# Patient Record
Sex: FEMALE | Race: BLACK OR AFRICAN AMERICAN | Employment: OTHER | URBAN - METROPOLITAN AREA
[De-identification: names, ages, dates, MRNs, and addresses within clinical notes are randomized per-mention and may not be internally consistent; named-entity substitution may affect disease eponyms.]

---

## 2019-01-14 ENCOUNTER — HOSPITAL ENCOUNTER (INPATIENT)
Age: 79
LOS: 3 days | Discharge: HOME | DRG: 378 | End: 2019-01-17
Attending: EMERGENCY MEDICINE | Admitting: INTERNAL MEDICINE
Payer: MEDICARE

## 2019-01-14 ENCOUNTER — APPOINTMENT (OUTPATIENT)
Dept: CT IMAGING | Age: 79
DRG: 378 | End: 2019-01-14
Payer: MEDICARE

## 2019-01-14 ENCOUNTER — APPOINTMENT (OUTPATIENT)
Dept: GENERAL RADIOLOGY | Age: 79
DRG: 378 | End: 2019-01-14
Payer: MEDICARE

## 2019-01-14 DIAGNOSIS — K92.1 HEMATOCHEZIA: Primary | ICD-10-CM

## 2019-01-14 PROBLEM — J45.909 ASTHMA: Status: ACTIVE | Noted: 2019-01-14

## 2019-01-14 PROBLEM — I10 HTN (HYPERTENSION): Status: ACTIVE | Noted: 2019-01-14

## 2019-01-14 PROBLEM — Z86.018: Status: ACTIVE | Noted: 2019-01-14

## 2019-01-14 PROBLEM — K92.2 GI BLEED: Status: ACTIVE | Noted: 2019-01-14

## 2019-01-14 LAB
ABO/RH: NORMAL
ABSOLUTE EOS #: 0.1 K/UL (ref 0–0.44)
ABSOLUTE IMMATURE GRANULOCYTE: <0.03 K/UL (ref 0–0.3)
ABSOLUTE LYMPH #: 1.11 K/UL (ref 1.1–3.7)
ABSOLUTE MONO #: 0.3 K/UL (ref 0.1–1.2)
ALBUMIN SERPL-MCNC: 3.4 G/DL (ref 3.5–5.2)
ALBUMIN/GLOBULIN RATIO: 1.3 (ref 1–2.5)
ALP BLD-CCNC: 72 U/L (ref 35–104)
ALT SERPL-CCNC: 9 U/L (ref 5–33)
ANION GAP SERPL CALCULATED.3IONS-SCNC: 11 MMOL/L (ref 9–17)
ANTIBODY SCREEN: NEGATIVE
ARM BAND NUMBER: NORMAL
AST SERPL-CCNC: 12 U/L
BASOPHILS # BLD: 0 % (ref 0–2)
BASOPHILS ABSOLUTE: <0.03 K/UL (ref 0–0.2)
BILIRUB SERPL-MCNC: 0.38 MG/DL (ref 0.3–1.2)
BUN BLDV-MCNC: 19 MG/DL (ref 8–23)
BUN/CREAT BLD: ABNORMAL (ref 9–20)
CALCIUM SERPL-MCNC: 9.5 MG/DL (ref 8.6–10.4)
CHLORIDE BLD-SCNC: 111 MMOL/L (ref 98–107)
CO2: 22 MMOL/L (ref 20–31)
CREAT SERPL-MCNC: 0.85 MG/DL (ref 0.5–0.9)
DIFFERENTIAL TYPE: ABNORMAL
EKG ATRIAL RATE: 92 BPM
EKG P AXIS: 23 DEGREES
EKG P-R INTERVAL: 180 MS
EKG Q-T INTERVAL: 368 MS
EKG QRS DURATION: 78 MS
EKG QTC CALCULATION (BAZETT): 455 MS
EKG R AXIS: -39 DEGREES
EKG T AXIS: 49 DEGREES
EKG VENTRICULAR RATE: 92 BPM
EOSINOPHILS RELATIVE PERCENT: 2 % (ref 1–4)
EXPIRATION DATE: NORMAL
FERRITIN: 64 UG/L (ref 13–150)
GFR AFRICAN AMERICAN: >60 ML/MIN
GFR NON-AFRICAN AMERICAN: >60 ML/MIN
GFR SERPL CREATININE-BSD FRML MDRD: ABNORMAL ML/MIN/{1.73_M2}
GFR SERPL CREATININE-BSD FRML MDRD: ABNORMAL ML/MIN/{1.73_M2}
GLUCOSE BLD-MCNC: 109 MG/DL (ref 70–99)
HCT VFR BLD CALC: 28.3 % (ref 36.3–47.1)
HCT VFR BLD CALC: 31.7 % (ref 36.3–47.1)
HEMOGLOBIN: 10.1 G/DL (ref 11.9–15.1)
HEMOGLOBIN: 8.7 G/DL (ref 11.9–15.1)
IMMATURE GRANULOCYTES: 0 %
INR BLD: 1.1
IRON SATURATION: 16 % (ref 20–55)
IRON: 39 UG/DL (ref 37–145)
LIPASE: 20 U/L (ref 13–60)
LYMPHOCYTES # BLD: 23 % (ref 24–43)
MCH RBC QN AUTO: 30.4 PG (ref 25.2–33.5)
MCHC RBC AUTO-ENTMCNC: 31.9 G/DL (ref 28.4–34.8)
MCV RBC AUTO: 95.5 FL (ref 82.6–102.9)
MONOCYTES # BLD: 6 % (ref 3–12)
NRBC AUTOMATED: 0 PER 100 WBC
PDW BLD-RTO: 12.6 % (ref 11.8–14.4)
PLATELET # BLD: 91 K/UL (ref 138–453)
PLATELET ESTIMATE: ABNORMAL
PMV BLD AUTO: 12.7 FL (ref 8.1–13.5)
POTASSIUM SERPL-SCNC: 3.9 MMOL/L (ref 3.7–5.3)
PROTHROMBIN TIME: 11.2 SEC (ref 9–12)
RBC # BLD: 3.32 M/UL (ref 3.95–5.11)
RBC # BLD: ABNORMAL 10*6/UL
SEG NEUTROPHILS: 69 % (ref 36–65)
SEGMENTED NEUTROPHILS ABSOLUTE COUNT: 3.28 K/UL (ref 1.5–8.1)
SODIUM BLD-SCNC: 144 MMOL/L (ref 135–144)
TOTAL IRON BINDING CAPACITY: 237 UG/DL (ref 250–450)
TOTAL PROTEIN: 6 G/DL (ref 6.4–8.3)
TROPONIN INTERP: ABNORMAL
TROPONIN INTERP: ABNORMAL
TROPONIN T: ABNORMAL NG/ML
TROPONIN T: ABNORMAL NG/ML
TROPONIN, HIGH SENSITIVITY: 32 NG/L (ref 0–14)
TROPONIN, HIGH SENSITIVITY: 35 NG/L (ref 0–14)
UNSATURATED IRON BINDING CAPACITY: 198 UG/DL (ref 112–347)
WBC # BLD: 4.8 K/UL (ref 3.5–11.3)
WBC # BLD: ABNORMAL 10*3/UL

## 2019-01-14 PROCEDURE — C9113 INJ PANTOPRAZOLE SODIUM, VIA: HCPCS | Performed by: STUDENT IN AN ORGANIZED HEALTH CARE EDUCATION/TRAINING PROGRAM

## 2019-01-14 PROCEDURE — 97530 THERAPEUTIC ACTIVITIES: CPT

## 2019-01-14 PROCEDURE — 83550 IRON BINDING TEST: CPT

## 2019-01-14 PROCEDURE — 6360000002 HC RX W HCPCS: Performed by: STUDENT IN AN ORGANIZED HEALTH CARE EDUCATION/TRAINING PROGRAM

## 2019-01-14 PROCEDURE — 83036 HEMOGLOBIN GLYCOSYLATED A1C: CPT

## 2019-01-14 PROCEDURE — 85610 PROTHROMBIN TIME: CPT

## 2019-01-14 PROCEDURE — APPNB30 APP NON BILLABLE TIME 0-30 MINS: Performed by: INTERNAL MEDICINE

## 2019-01-14 PROCEDURE — 71045 X-RAY EXAM CHEST 1 VIEW: CPT

## 2019-01-14 PROCEDURE — 2580000003 HC RX 258: Performed by: STUDENT IN AN ORGANIZED HEALTH CARE EDUCATION/TRAINING PROGRAM

## 2019-01-14 PROCEDURE — 86900 BLOOD TYPING SEROLOGIC ABO: CPT

## 2019-01-14 PROCEDURE — 74174 CTA ABD&PLVS W/CONTRAST: CPT

## 2019-01-14 PROCEDURE — 85014 HEMATOCRIT: CPT

## 2019-01-14 PROCEDURE — 36415 COLL VENOUS BLD VENIPUNCTURE: CPT

## 2019-01-14 PROCEDURE — 82728 ASSAY OF FERRITIN: CPT

## 2019-01-14 PROCEDURE — 83690 ASSAY OF LIPASE: CPT

## 2019-01-14 PROCEDURE — 6370000000 HC RX 637 (ALT 250 FOR IP): Performed by: INTERNAL MEDICINE

## 2019-01-14 PROCEDURE — 86850 RBC ANTIBODY SCREEN: CPT

## 2019-01-14 PROCEDURE — 93005 ELECTROCARDIOGRAM TRACING: CPT

## 2019-01-14 PROCEDURE — 6370000000 HC RX 637 (ALT 250 FOR IP): Performed by: STUDENT IN AN ORGANIZED HEALTH CARE EDUCATION/TRAINING PROGRAM

## 2019-01-14 PROCEDURE — 84484 ASSAY OF TROPONIN QUANT: CPT

## 2019-01-14 PROCEDURE — 85025 COMPLETE CBC W/AUTO DIFF WBC: CPT

## 2019-01-14 PROCEDURE — 1200000000 HC SEMI PRIVATE

## 2019-01-14 PROCEDURE — 85018 HEMOGLOBIN: CPT

## 2019-01-14 PROCEDURE — 86901 BLOOD TYPING SEROLOGIC RH(D): CPT

## 2019-01-14 PROCEDURE — 83540 ASSAY OF IRON: CPT

## 2019-01-14 PROCEDURE — 99285 EMERGENCY DEPT VISIT HI MDM: CPT

## 2019-01-14 PROCEDURE — 97162 PT EVAL MOD COMPLEX 30 MIN: CPT

## 2019-01-14 PROCEDURE — 80053 COMPREHEN METABOLIC PANEL: CPT

## 2019-01-14 PROCEDURE — 6360000004 HC RX CONTRAST MEDICATION: Performed by: EMERGENCY MEDICINE

## 2019-01-14 RX ORDER — SODIUM CHLORIDE 0.9 % (FLUSH) 0.9 %
10 SYRINGE (ML) INJECTION PRN
Status: DISCONTINUED | OUTPATIENT
Start: 2019-01-14 | End: 2019-01-17 | Stop reason: HOSPADM

## 2019-01-14 RX ORDER — ALBUTEROL SULFATE 2.5 MG/3ML
2.5 SOLUTION RESPIRATORY (INHALATION) EVERY 6 HOURS PRN
Status: ON HOLD | COMMUNITY
End: 2019-01-14 | Stop reason: CLARIF

## 2019-01-14 RX ORDER — ASPIRIN 81 MG/1
81 TABLET ORAL DAILY
COMMUNITY

## 2019-01-14 RX ORDER — 0.9 % SODIUM CHLORIDE 0.9 %
500 INTRAVENOUS SOLUTION INTRAVENOUS ONCE
Status: COMPLETED | OUTPATIENT
Start: 2019-01-14 | End: 2019-01-14

## 2019-01-14 RX ORDER — METOPROLOL SUCCINATE 25 MG/1
25 TABLET, EXTENDED RELEASE ORAL 2 TIMES DAILY
Status: ON HOLD | COMMUNITY
End: 2021-01-01 | Stop reason: SDUPTHER

## 2019-01-14 RX ORDER — HYDRALAZINE HYDROCHLORIDE 25 MG/1
25 TABLET, FILM COATED ORAL 2 TIMES DAILY
Status: DISCONTINUED | OUTPATIENT
Start: 2019-01-14 | End: 2019-01-15

## 2019-01-14 RX ORDER — 0.9 % SODIUM CHLORIDE 0.9 %
10 VIAL (ML) INJECTION EVERY 12 HOURS
Status: DISCONTINUED | OUTPATIENT
Start: 2019-01-14 | End: 2019-01-17 | Stop reason: HOSPADM

## 2019-01-14 RX ORDER — ALBUTEROL SULFATE 90 UG/1
2 AEROSOL, METERED RESPIRATORY (INHALATION) EVERY 4 HOURS PRN
Status: DISCONTINUED | OUTPATIENT
Start: 2019-01-14 | End: 2019-01-17 | Stop reason: HOSPADM

## 2019-01-14 RX ORDER — POTASSIUM CHLORIDE 750 MG/1
20 CAPSULE, EXTENDED RELEASE ORAL 2 TIMES DAILY
COMMUNITY

## 2019-01-14 RX ORDER — MONTELUKAST SODIUM 10 MG/1
5 TABLET ORAL NIGHTLY
COMMUNITY

## 2019-01-14 RX ORDER — METOPROLOL SUCCINATE 25 MG/1
25 TABLET, EXTENDED RELEASE ORAL 2 TIMES DAILY
Status: DISCONTINUED | OUTPATIENT
Start: 2019-01-14 | End: 2019-01-17 | Stop reason: HOSPADM

## 2019-01-14 RX ORDER — ACETAMINOPHEN 325 MG/1
650 TABLET ORAL EVERY 4 HOURS PRN
Status: DISCONTINUED | OUTPATIENT
Start: 2019-01-14 | End: 2019-01-17 | Stop reason: HOSPADM

## 2019-01-14 RX ORDER — PANTOPRAZOLE SODIUM 40 MG/10ML
40 INJECTION, POWDER, LYOPHILIZED, FOR SOLUTION INTRAVENOUS EVERY 12 HOURS
Status: DISCONTINUED | OUTPATIENT
Start: 2019-01-14 | End: 2019-01-15

## 2019-01-14 RX ORDER — HYDROCHLOROTHIAZIDE 25 MG/1
25 TABLET ORAL 2 TIMES DAILY
Status: ON HOLD | COMMUNITY
End: 2019-01-14 | Stop reason: CLARIF

## 2019-01-14 RX ORDER — SODIUM CHLORIDE 9 MG/ML
INJECTION, SOLUTION INTRAVENOUS CONTINUOUS
Status: DISCONTINUED | OUTPATIENT
Start: 2019-01-14 | End: 2019-01-17

## 2019-01-14 RX ORDER — SODIUM CHLORIDE 0.9 % (FLUSH) 0.9 %
10 SYRINGE (ML) INJECTION EVERY 12 HOURS SCHEDULED
Status: DISCONTINUED | OUTPATIENT
Start: 2019-01-14 | End: 2019-01-14

## 2019-01-14 RX ORDER — MONTELUKAST SODIUM 10 MG/1
5 TABLET ORAL NIGHTLY
Status: DISCONTINUED | OUTPATIENT
Start: 2019-01-14 | End: 2019-01-17 | Stop reason: HOSPADM

## 2019-01-14 RX ORDER — SODIUM CHLORIDE 0.9 % (FLUSH) 0.9 %
10 SYRINGE (ML) INJECTION PRN
Status: DISCONTINUED | OUTPATIENT
Start: 2019-01-14 | End: 2019-01-14

## 2019-01-14 RX ORDER — HYDRALAZINE HYDROCHLORIDE 25 MG/1
25 TABLET, FILM COATED ORAL 2 TIMES DAILY
Status: ON HOLD | COMMUNITY
End: 2019-01-17 | Stop reason: HOSPADM

## 2019-01-14 RX ORDER — SODIUM CHLORIDE 0.9 % (FLUSH) 0.9 %
10 SYRINGE (ML) INJECTION EVERY 12 HOURS SCHEDULED
Status: DISCONTINUED | OUTPATIENT
Start: 2019-01-14 | End: 2019-01-17 | Stop reason: HOSPADM

## 2019-01-14 RX ORDER — ONDANSETRON 2 MG/ML
4 INJECTION INTRAMUSCULAR; INTRAVENOUS EVERY 6 HOURS PRN
Status: DISCONTINUED | OUTPATIENT
Start: 2019-01-14 | End: 2019-01-17 | Stop reason: HOSPADM

## 2019-01-14 RX ADMIN — SODIUM CHLORIDE: 9 INJECTION, SOLUTION INTRAVENOUS at 11:51

## 2019-01-14 RX ADMIN — POLYETHYLENE GLYCOL 3350, SODIUM SULFATE ANHYDROUS, SODIUM BICARBONATE, SODIUM CHLORIDE, POTASSIUM CHLORIDE 2000 ML: 236; 22.74; 6.74; 5.86; 2.97 POWDER, FOR SOLUTION ORAL at 17:35

## 2019-01-14 RX ADMIN — SODIUM CHLORIDE 80 MG: 9 INJECTION, SOLUTION INTRAVENOUS at 10:06

## 2019-01-14 RX ADMIN — IOPAMIDOL 75 ML: 755 INJECTION, SOLUTION INTRAVENOUS at 06:53

## 2019-01-14 RX ADMIN — SODIUM CHLORIDE 500 ML: 0.9 INJECTION, SOLUTION INTRAVENOUS at 06:00

## 2019-01-14 RX ADMIN — HYDRALAZINE HYDROCHLORIDE 25 MG: 25 TABLET, FILM COATED ORAL at 22:09

## 2019-01-14 RX ADMIN — Medication 10 ML: at 22:09

## 2019-01-14 RX ADMIN — METOPROLOL SUCCINATE 25 MG: 25 TABLET, FILM COATED, EXTENDED RELEASE ORAL at 22:09

## 2019-01-14 RX ADMIN — POLYETHYLENE GLYCOL 3350, SODIUM SULFATE ANHYDROUS, SODIUM BICARBONATE, SODIUM CHLORIDE, POTASSIUM CHLORIDE 2000 ML: 236; 22.74; 6.74; 5.86; 2.97 POWDER, FOR SOLUTION ORAL at 22:12

## 2019-01-14 RX ADMIN — MONTELUKAST SODIUM 5 MG: 10 TABLET, FILM COATED ORAL at 22:09

## 2019-01-14 ASSESSMENT — PAIN SCALES - GENERAL
PAINLEVEL_OUTOF10: 0
PAINLEVEL_OUTOF10: 5

## 2019-01-14 ASSESSMENT — PAIN DESCRIPTION - LOCATION: LOCATION: ABDOMEN

## 2019-01-14 ASSESSMENT — ENCOUNTER SYMPTOMS
NAUSEA: 0
BLOOD IN STOOL: 1
ABDOMINAL PAIN: 1
VOMITING: 0
SHORTNESS OF BREATH: 0
DIARRHEA: 0

## 2019-01-15 ENCOUNTER — ANESTHESIA (OUTPATIENT)
Dept: ENDOSCOPY | Age: 79
DRG: 378 | End: 2019-01-15
Payer: MEDICARE

## 2019-01-15 ENCOUNTER — ANESTHESIA EVENT (OUTPATIENT)
Dept: ENDOSCOPY | Age: 79
DRG: 378 | End: 2019-01-15
Payer: MEDICARE

## 2019-01-15 VITALS
DIASTOLIC BLOOD PRESSURE: 115 MMHG | OXYGEN SATURATION: 98 % | RESPIRATION RATE: 31 BRPM | SYSTOLIC BLOOD PRESSURE: 150 MMHG

## 2019-01-15 LAB
ABSOLUTE EOS #: 0.14 K/UL (ref 0–0.44)
ABSOLUTE IMMATURE GRANULOCYTE: <0.03 K/UL (ref 0–0.3)
ABSOLUTE LYMPH #: 1.43 K/UL (ref 1.1–3.7)
ABSOLUTE MONO #: 0.27 K/UL (ref 0.1–1.2)
ANION GAP SERPL CALCULATED.3IONS-SCNC: 12 MMOL/L (ref 9–17)
BASOPHILS # BLD: 0 % (ref 0–2)
BASOPHILS ABSOLUTE: <0.03 K/UL (ref 0–0.2)
BUN BLDV-MCNC: 8 MG/DL (ref 8–23)
BUN/CREAT BLD: ABNORMAL (ref 9–20)
CALCIUM SERPL-MCNC: 9.4 MG/DL (ref 8.6–10.4)
CHLORIDE BLD-SCNC: 110 MMOL/L (ref 98–107)
CO2: 22 MMOL/L (ref 20–31)
CREAT SERPL-MCNC: 0.56 MG/DL (ref 0.5–0.9)
DIFFERENTIAL TYPE: ABNORMAL
EOSINOPHILS RELATIVE PERCENT: 3 % (ref 1–4)
ESTIMATED AVERAGE GLUCOSE: 103 MG/DL
GFR AFRICAN AMERICAN: >60 ML/MIN
GFR NON-AFRICAN AMERICAN: >60 ML/MIN
GFR SERPL CREATININE-BSD FRML MDRD: ABNORMAL ML/MIN/{1.73_M2}
GFR SERPL CREATININE-BSD FRML MDRD: ABNORMAL ML/MIN/{1.73_M2}
GLUCOSE BLD-MCNC: 84 MG/DL (ref 65–105)
GLUCOSE BLD-MCNC: 88 MG/DL (ref 70–99)
GLUCOSE BLD-MCNC: 97 MG/DL (ref 65–105)
HBA1C MFR BLD: 5.2 % (ref 4–6)
HCT VFR BLD CALC: 26.4 % (ref 36.3–47.1)
HCT VFR BLD CALC: 29.2 % (ref 36.3–47.1)
HEMOGLOBIN: 8.4 G/DL (ref 11.9–15.1)
HEMOGLOBIN: 9.2 G/DL (ref 11.9–15.1)
IMMATURE GRANULOCYTES: 0 %
LYMPHOCYTES # BLD: 35 % (ref 24–43)
MAGNESIUM: 1.9 MG/DL (ref 1.6–2.6)
MCH RBC QN AUTO: 31.3 PG (ref 25.2–33.5)
MCHC RBC AUTO-ENTMCNC: 31.8 G/DL (ref 28.4–34.8)
MCV RBC AUTO: 98.5 FL (ref 82.6–102.9)
MONOCYTES # BLD: 7 % (ref 3–12)
NRBC AUTOMATED: 0 PER 100 WBC
PDW BLD-RTO: 12.9 % (ref 11.8–14.4)
PLATELET # BLD: ABNORMAL K/UL (ref 138–453)
PLATELET ESTIMATE: ABNORMAL
PLATELET, FLUORESCENCE: 135 K/UL (ref 138–453)
PLATELET, IMMATURE FRACTION: 3.6 % (ref 1.1–10.3)
PMV BLD AUTO: ABNORMAL FL (ref 8.1–13.5)
POTASSIUM SERPL-SCNC: 3.4 MMOL/L (ref 3.7–5.3)
RBC # BLD: 2.68 M/UL (ref 3.95–5.11)
RBC # BLD: ABNORMAL 10*6/UL
SEG NEUTROPHILS: 54 % (ref 36–65)
SEGMENTED NEUTROPHILS ABSOLUTE COUNT: 2.2 K/UL (ref 1.5–8.1)
SODIUM BLD-SCNC: 144 MMOL/L (ref 135–144)
WBC # BLD: 4.1 K/UL (ref 3.5–11.3)
WBC # BLD: ABNORMAL 10*3/UL

## 2019-01-15 PROCEDURE — 85055 RETICULATED PLATELET ASSAY: CPT

## 2019-01-15 PROCEDURE — 97166 OT EVAL MOD COMPLEX 45 MIN: CPT

## 2019-01-15 PROCEDURE — 0DB68ZX EXCISION OF STOMACH, VIA NATURAL OR ARTIFICIAL OPENING ENDOSCOPIC, DIAGNOSTIC: ICD-10-PCS | Performed by: INTERNAL MEDICINE

## 2019-01-15 PROCEDURE — 6370000000 HC RX 637 (ALT 250 FOR IP): Performed by: STUDENT IN AN ORGANIZED HEALTH CARE EDUCATION/TRAINING PROGRAM

## 2019-01-15 PROCEDURE — C9113 INJ PANTOPRAZOLE SODIUM, VIA: HCPCS | Performed by: INTERNAL MEDICINE

## 2019-01-15 PROCEDURE — 3609009500 HC COLONOSCOPY DIAGNOSTIC OR SCREENING: Performed by: INTERNAL MEDICINE

## 2019-01-15 PROCEDURE — 1200000000 HC SEMI PRIVATE

## 2019-01-15 PROCEDURE — 7100000011 HC PHASE II RECOVERY - ADDTL 15 MIN: Performed by: INTERNAL MEDICINE

## 2019-01-15 PROCEDURE — 3700000000 HC ANESTHESIA ATTENDED CARE: Performed by: INTERNAL MEDICINE

## 2019-01-15 PROCEDURE — 2580000003 HC RX 258: Performed by: STUDENT IN AN ORGANIZED HEALTH CARE EDUCATION/TRAINING PROGRAM

## 2019-01-15 PROCEDURE — C9113 INJ PANTOPRAZOLE SODIUM, VIA: HCPCS | Performed by: STUDENT IN AN ORGANIZED HEALTH CARE EDUCATION/TRAINING PROGRAM

## 2019-01-15 PROCEDURE — 6360000002 HC RX W HCPCS: Performed by: INTERNAL MEDICINE

## 2019-01-15 PROCEDURE — 43239 EGD BIOPSY SINGLE/MULTIPLE: CPT | Performed by: INTERNAL MEDICINE

## 2019-01-15 PROCEDURE — 2580000003 HC RX 258: Performed by: NURSE ANESTHETIST, CERTIFIED REGISTERED

## 2019-01-15 PROCEDURE — 3700000001 HC ADD 15 MINUTES (ANESTHESIA): Performed by: INTERNAL MEDICINE

## 2019-01-15 PROCEDURE — 88305 TISSUE EXAM BY PATHOLOGIST: CPT

## 2019-01-15 PROCEDURE — 97535 SELF CARE MNGMENT TRAINING: CPT

## 2019-01-15 PROCEDURE — 2580000003 HC RX 258: Performed by: INTERNAL MEDICINE

## 2019-01-15 PROCEDURE — 83735 ASSAY OF MAGNESIUM: CPT

## 2019-01-15 PROCEDURE — 36415 COLL VENOUS BLD VENIPUNCTURE: CPT

## 2019-01-15 PROCEDURE — 0DJD8ZZ INSPECTION OF LOWER INTESTINAL TRACT, VIA NATURAL OR ARTIFICIAL OPENING ENDOSCOPIC: ICD-10-PCS | Performed by: INTERNAL MEDICINE

## 2019-01-15 PROCEDURE — 82947 ASSAY GLUCOSE BLOOD QUANT: CPT

## 2019-01-15 PROCEDURE — 7100000010 HC PHASE II RECOVERY - FIRST 15 MIN: Performed by: INTERNAL MEDICINE

## 2019-01-15 PROCEDURE — 80048 BASIC METABOLIC PNL TOTAL CA: CPT

## 2019-01-15 PROCEDURE — 3609012400 HC EGD TRANSORAL BIOPSY SINGLE/MULTIPLE: Performed by: INTERNAL MEDICINE

## 2019-01-15 PROCEDURE — 6360000002 HC RX W HCPCS: Performed by: NURSE ANESTHETIST, CERTIFIED REGISTERED

## 2019-01-15 PROCEDURE — 85018 HEMOGLOBIN: CPT

## 2019-01-15 PROCEDURE — 85014 HEMATOCRIT: CPT

## 2019-01-15 PROCEDURE — G0121 COLON CA SCRN NOT HI RSK IND: HCPCS | Performed by: INTERNAL MEDICINE

## 2019-01-15 PROCEDURE — 2709999900 HC NON-CHARGEABLE SUPPLY: Performed by: INTERNAL MEDICINE

## 2019-01-15 PROCEDURE — 85025 COMPLETE CBC W/AUTO DIFF WBC: CPT

## 2019-01-15 PROCEDURE — 2500000003 HC RX 250 WO HCPCS: Performed by: NURSE ANESTHETIST, CERTIFIED REGISTERED

## 2019-01-15 PROCEDURE — 99223 1ST HOSP IP/OBS HIGH 75: CPT | Performed by: INTERNAL MEDICINE

## 2019-01-15 PROCEDURE — 6370000000 HC RX 637 (ALT 250 FOR IP): Performed by: INTERNAL MEDICINE

## 2019-01-15 PROCEDURE — 6360000002 HC RX W HCPCS: Performed by: STUDENT IN AN ORGANIZED HEALTH CARE EDUCATION/TRAINING PROGRAM

## 2019-01-15 RX ORDER — PROPOFOL 10 MG/ML
INJECTION, EMULSION INTRAVENOUS CONTINUOUS PRN
Status: DISCONTINUED | OUTPATIENT
Start: 2019-01-15 | End: 2019-01-15 | Stop reason: SDUPTHER

## 2019-01-15 RX ORDER — PANTOPRAZOLE SODIUM 40 MG/10ML
40 INJECTION, POWDER, LYOPHILIZED, FOR SOLUTION INTRAVENOUS ONCE
Status: COMPLETED | OUTPATIENT
Start: 2019-01-15 | End: 2019-01-15

## 2019-01-15 RX ORDER — PROPOFOL 10 MG/ML
INJECTION, EMULSION INTRAVENOUS PRN
Status: DISCONTINUED | OUTPATIENT
Start: 2019-01-15 | End: 2019-01-15 | Stop reason: SDUPTHER

## 2019-01-15 RX ORDER — HYDRALAZINE HYDROCHLORIDE 50 MG/1
50 TABLET, FILM COATED ORAL 2 TIMES DAILY
Status: DISCONTINUED | OUTPATIENT
Start: 2019-01-15 | End: 2019-01-17 | Stop reason: HOSPADM

## 2019-01-15 RX ORDER — SODIUM CHLORIDE 9 MG/ML
INJECTION, SOLUTION INTRAVENOUS CONTINUOUS PRN
Status: DISCONTINUED | OUTPATIENT
Start: 2019-01-15 | End: 2019-01-15 | Stop reason: SDUPTHER

## 2019-01-15 RX ORDER — GLYCOPYRROLATE 1 MG/5 ML
SYRINGE (ML) INTRAVENOUS PRN
Status: DISCONTINUED | OUTPATIENT
Start: 2019-01-15 | End: 2019-01-15 | Stop reason: SDUPTHER

## 2019-01-15 RX ORDER — POTASSIUM CHLORIDE 20 MEQ/1
40 TABLET, EXTENDED RELEASE ORAL 2 TIMES DAILY WITH MEALS
Status: COMPLETED | OUTPATIENT
Start: 2019-01-15 | End: 2019-01-15

## 2019-01-15 RX ORDER — 0.9 % SODIUM CHLORIDE 0.9 %
10 VIAL (ML) INJECTION ONCE
Status: COMPLETED | OUTPATIENT
Start: 2019-01-15 | End: 2019-01-15

## 2019-01-15 RX ORDER — LANOLIN ALCOHOL/MO/W.PET/CERES
325 CREAM (GRAM) TOPICAL 2 TIMES DAILY WITH MEALS
Status: DISCONTINUED | OUTPATIENT
Start: 2019-01-15 | End: 2019-01-17 | Stop reason: HOSPADM

## 2019-01-15 RX ADMIN — POTASSIUM CHLORIDE 40 MEQ: 1500 TABLET, EXTENDED RELEASE ORAL at 16:00

## 2019-01-15 RX ADMIN — POTASSIUM CHLORIDE 40 MEQ: 1500 TABLET, EXTENDED RELEASE ORAL at 10:25

## 2019-01-15 RX ADMIN — SODIUM CHLORIDE: 9 INJECTION, SOLUTION INTRAVENOUS at 09:15

## 2019-01-15 RX ADMIN — PROPOFOL 20 MG: 10 INJECTION, EMULSION INTRAVENOUS at 09:07

## 2019-01-15 RX ADMIN — PROPOFOL 30 MG: 10 INJECTION, EMULSION INTRAVENOUS at 09:08

## 2019-01-15 RX ADMIN — METOPROLOL SUCCINATE 25 MG: 25 TABLET, FILM COATED, EXTENDED RELEASE ORAL at 10:24

## 2019-01-15 RX ADMIN — PANTOPRAZOLE SODIUM 40 MG: 40 INJECTION, POWDER, FOR SOLUTION INTRAVENOUS at 00:27

## 2019-01-15 RX ADMIN — MONTELUKAST SODIUM 5 MG: 10 TABLET, FILM COATED ORAL at 21:28

## 2019-01-15 RX ADMIN — POLYETHYLENE GLYCOL 3350, SODIUM SULFATE ANHYDROUS, SODIUM BICARBONATE, SODIUM CHLORIDE, POTASSIUM CHLORIDE 4000 ML: 236; 22.74; 6.74; 5.86; 2.97 POWDER, FOR SOLUTION ORAL at 15:58

## 2019-01-15 RX ADMIN — ACETAMINOPHEN 650 MG: 325 TABLET ORAL at 10:46

## 2019-01-15 RX ADMIN — FERROUS SULFATE TAB EC 325 MG (65 MG FE EQUIVALENT) 325 MG: 325 (65 FE) TABLET DELAYED RESPONSE at 16:00

## 2019-01-15 RX ADMIN — PROPOFOL 75 MCG/KG/MIN: 10 INJECTION, EMULSION INTRAVENOUS at 09:04

## 2019-01-15 RX ADMIN — SODIUM CHLORIDE: 9 INJECTION, SOLUTION INTRAVENOUS at 09:00

## 2019-01-15 RX ADMIN — Medication 0.2 MG: at 09:01

## 2019-01-15 RX ADMIN — HYDRALAZINE HYDROCHLORIDE 50 MG: 50 TABLET, FILM COATED ORAL at 21:28

## 2019-01-15 RX ADMIN — PANTOPRAZOLE SODIUM 40 MG: 40 INJECTION, POWDER, FOR SOLUTION INTRAVENOUS at 10:51

## 2019-01-15 RX ADMIN — SODIUM CHLORIDE 10 ML: 9 INJECTION INTRAMUSCULAR; INTRAVENOUS; SUBCUTANEOUS at 00:27

## 2019-01-15 RX ADMIN — PROPOFOL 50 MG: 10 INJECTION, EMULSION INTRAVENOUS at 09:04

## 2019-01-15 RX ADMIN — PROPOFOL 50 MG: 10 INJECTION, EMULSION INTRAVENOUS at 09:06

## 2019-01-15 RX ADMIN — HYDRALAZINE HYDROCHLORIDE 50 MG: 50 TABLET, FILM COATED ORAL at 10:24

## 2019-01-15 RX ADMIN — Medication 10 ML: at 10:51

## 2019-01-15 RX ADMIN — METOPROLOL SUCCINATE 25 MG: 25 TABLET, FILM COATED, EXTENDED RELEASE ORAL at 19:27

## 2019-01-15 ASSESSMENT — PAIN SCALES - GENERAL
PAINLEVEL_OUTOF10: 0
PAINLEVEL_OUTOF10: 3
PAINLEVEL_OUTOF10: 3
PAINLEVEL_OUTOF10: 0
PAINLEVEL_OUTOF10: 3
PAINLEVEL_OUTOF10: 8

## 2019-01-15 ASSESSMENT — PAIN DESCRIPTION - PAIN TYPE: TYPE: ACUTE PAIN

## 2019-01-15 ASSESSMENT — PAIN DESCRIPTION - ORIENTATION
ORIENTATION: RIGHT

## 2019-01-15 ASSESSMENT — PAIN DESCRIPTION - LOCATION
LOCATION: HIP

## 2019-01-15 ASSESSMENT — PAIN DESCRIPTION - FREQUENCY: FREQUENCY: INTERMITTENT

## 2019-01-15 ASSESSMENT — PAIN - FUNCTIONAL ASSESSMENT: PAIN_FUNCTIONAL_ASSESSMENT: 0-10

## 2019-01-16 ENCOUNTER — ANESTHESIA (OUTPATIENT)
Dept: ENDOSCOPY | Age: 79
DRG: 378 | End: 2019-01-16
Payer: MEDICARE

## 2019-01-16 ENCOUNTER — APPOINTMENT (OUTPATIENT)
Dept: GENERAL RADIOLOGY | Age: 79
DRG: 378 | End: 2019-01-16
Payer: MEDICARE

## 2019-01-16 ENCOUNTER — ANESTHESIA EVENT (OUTPATIENT)
Dept: ENDOSCOPY | Age: 79
DRG: 378 | End: 2019-01-16
Payer: MEDICARE

## 2019-01-16 VITALS
DIASTOLIC BLOOD PRESSURE: 69 MMHG | SYSTOLIC BLOOD PRESSURE: 121 MMHG | RESPIRATION RATE: 21 BRPM | OXYGEN SATURATION: 99 %

## 2019-01-16 PROBLEM — K92.2 GI BLEED: Status: RESOLVED | Noted: 2019-01-14 | Resolved: 2019-01-16

## 2019-01-16 PROBLEM — M25.551 RIGHT HIP PAIN: Status: ACTIVE | Noted: 2019-01-16

## 2019-01-16 LAB
HCT VFR BLD CALC: 28.5 % (ref 36.3–47.1)
HCT VFR BLD CALC: 29.2 % (ref 36.3–47.1)
HEMOGLOBIN: 8.7 G/DL (ref 11.9–15.1)
HEMOGLOBIN: 8.9 G/DL (ref 11.9–15.1)
LV EF: 68 %
LVEF MODALITY: NORMAL
POTASSIUM SERPL-SCNC: 3.5 MMOL/L (ref 3.7–5.3)
SURGICAL PATHOLOGY REPORT: NORMAL

## 2019-01-16 PROCEDURE — 6370000000 HC RX 637 (ALT 250 FOR IP): Performed by: STUDENT IN AN ORGANIZED HEALTH CARE EDUCATION/TRAINING PROGRAM

## 2019-01-16 PROCEDURE — 85014 HEMATOCRIT: CPT

## 2019-01-16 PROCEDURE — 73502 X-RAY EXAM HIP UNI 2-3 VIEWS: CPT

## 2019-01-16 PROCEDURE — 73560 X-RAY EXAM OF KNEE 1 OR 2: CPT

## 2019-01-16 PROCEDURE — 84132 ASSAY OF SERUM POTASSIUM: CPT

## 2019-01-16 PROCEDURE — 6360000002 HC RX W HCPCS: Performed by: STUDENT IN AN ORGANIZED HEALTH CARE EDUCATION/TRAINING PROGRAM

## 2019-01-16 PROCEDURE — 1200000000 HC SEMI PRIVATE

## 2019-01-16 PROCEDURE — 3609010600 HC COLONOSCOPY POLYPECTOMY SNARE/COLD BIOPSY: Performed by: INTERNAL MEDICINE

## 2019-01-16 PROCEDURE — 2580000003 HC RX 258: Performed by: STUDENT IN AN ORGANIZED HEALTH CARE EDUCATION/TRAINING PROGRAM

## 2019-01-16 PROCEDURE — 99232 SBSQ HOSP IP/OBS MODERATE 35: CPT | Performed by: INTERNAL MEDICINE

## 2019-01-16 PROCEDURE — 85018 HEMOGLOBIN: CPT

## 2019-01-16 PROCEDURE — 0DBL8ZZ EXCISION OF TRANSVERSE COLON, VIA NATURAL OR ARTIFICIAL OPENING ENDOSCOPIC: ICD-10-PCS | Performed by: INTERNAL MEDICINE

## 2019-01-16 PROCEDURE — 0DBM8ZZ EXCISION OF DESCENDING COLON, VIA NATURAL OR ARTIFICIAL OPENING ENDOSCOPIC: ICD-10-PCS | Performed by: INTERNAL MEDICINE

## 2019-01-16 PROCEDURE — 93306 TTE W/DOPPLER COMPLETE: CPT

## 2019-01-16 PROCEDURE — 6360000002 HC RX W HCPCS: Performed by: SPECIALIST

## 2019-01-16 PROCEDURE — 88305 TISSUE EXAM BY PATHOLOGIST: CPT

## 2019-01-16 PROCEDURE — 3700000001 HC ADD 15 MINUTES (ANESTHESIA): Performed by: INTERNAL MEDICINE

## 2019-01-16 PROCEDURE — 45385 COLONOSCOPY W/LESION REMOVAL: CPT | Performed by: INTERNAL MEDICINE

## 2019-01-16 PROCEDURE — 3700000000 HC ANESTHESIA ATTENDED CARE: Performed by: INTERNAL MEDICINE

## 2019-01-16 PROCEDURE — 36415 COLL VENOUS BLD VENIPUNCTURE: CPT

## 2019-01-16 PROCEDURE — 2500000003 HC RX 250 WO HCPCS: Performed by: SPECIALIST

## 2019-01-16 PROCEDURE — 7100000010 HC PHASE II RECOVERY - FIRST 15 MIN: Performed by: INTERNAL MEDICINE

## 2019-01-16 PROCEDURE — 7100000011 HC PHASE II RECOVERY - ADDTL 15 MIN: Performed by: INTERNAL MEDICINE

## 2019-01-16 PROCEDURE — C1773 RET DEV, INSERTABLE: HCPCS | Performed by: INTERNAL MEDICINE

## 2019-01-16 RX ORDER — LIDOCAINE HYDROCHLORIDE 10 MG/ML
INJECTION, SOLUTION EPIDURAL; INFILTRATION; INTRACAUDAL; PERINEURAL PRN
Status: DISCONTINUED | OUTPATIENT
Start: 2019-01-16 | End: 2019-01-16 | Stop reason: SDUPTHER

## 2019-01-16 RX ORDER — PROPOFOL 10 MG/ML
INJECTION, EMULSION INTRAVENOUS PRN
Status: DISCONTINUED | OUTPATIENT
Start: 2019-01-16 | End: 2019-01-16 | Stop reason: SDUPTHER

## 2019-01-16 RX ORDER — ASPIRIN 81 MG/1
81 TABLET ORAL DAILY
Status: DISCONTINUED | OUTPATIENT
Start: 2019-01-16 | End: 2019-01-17 | Stop reason: HOSPADM

## 2019-01-16 RX ADMIN — ASPIRIN 81 MG: 81 TABLET ORAL at 14:43

## 2019-01-16 RX ADMIN — PROPOFOL 50 MG: 10 INJECTION, EMULSION INTRAVENOUS at 12:15

## 2019-01-16 RX ADMIN — ACETAMINOPHEN 650 MG: 325 TABLET ORAL at 19:14

## 2019-01-16 RX ADMIN — LIDOCAINE HYDROCHLORIDE 30 MG: 10 INJECTION, SOLUTION EPIDURAL; INFILTRATION; INTRACAUDAL at 12:05

## 2019-01-16 RX ADMIN — Medication 10 ML: at 09:47

## 2019-01-16 RX ADMIN — HYDRALAZINE HYDROCHLORIDE 50 MG: 50 TABLET, FILM COATED ORAL at 09:47

## 2019-01-16 RX ADMIN — PROPOFOL 50 MG: 10 INJECTION, EMULSION INTRAVENOUS at 12:20

## 2019-01-16 RX ADMIN — METOPROLOL SUCCINATE 25 MG: 25 TABLET, FILM COATED, EXTENDED RELEASE ORAL at 19:16

## 2019-01-16 RX ADMIN — PROPOFOL 50 MG: 10 INJECTION, EMULSION INTRAVENOUS at 12:05

## 2019-01-16 RX ADMIN — ENOXAPARIN SODIUM 30 MG: 30 INJECTION SUBCUTANEOUS at 14:43

## 2019-01-16 RX ADMIN — METOPROLOL SUCCINATE 25 MG: 25 TABLET, FILM COATED, EXTENDED RELEASE ORAL at 09:47

## 2019-01-16 RX ADMIN — HYDRALAZINE HYDROCHLORIDE 50 MG: 50 TABLET, FILM COATED ORAL at 20:59

## 2019-01-16 RX ADMIN — PROPOFOL 50 MG: 10 INJECTION, EMULSION INTRAVENOUS at 12:10

## 2019-01-16 RX ADMIN — MONTELUKAST SODIUM 5 MG: 10 TABLET, FILM COATED ORAL at 20:59

## 2019-01-16 RX ADMIN — FERROUS SULFATE TAB EC 325 MG (65 MG FE EQUIVALENT) 325 MG: 325 (65 FE) TABLET DELAYED RESPONSE at 19:15

## 2019-01-16 RX ADMIN — FERROUS SULFATE TAB EC 325 MG (65 MG FE EQUIVALENT) 325 MG: 325 (65 FE) TABLET DELAYED RESPONSE at 09:47

## 2019-01-16 ASSESSMENT — ENCOUNTER SYMPTOMS: SHORTNESS OF BREATH: 0

## 2019-01-16 ASSESSMENT — PAIN SCALES - GENERAL
PAINLEVEL_OUTOF10: 8
PAINLEVEL_OUTOF10: 3
PAINLEVEL_OUTOF10: 3

## 2019-01-16 ASSESSMENT — LIFESTYLE VARIABLES: SMOKING_STATUS: 0

## 2019-01-16 ASSESSMENT — PAIN - FUNCTIONAL ASSESSMENT: PAIN_FUNCTIONAL_ASSESSMENT: 0-10

## 2019-01-17 VITALS
TEMPERATURE: 98.7 F | HEIGHT: 68 IN | RESPIRATION RATE: 16 BRPM | WEIGHT: 246 LBS | SYSTOLIC BLOOD PRESSURE: 108 MMHG | BODY MASS INDEX: 37.28 KG/M2 | OXYGEN SATURATION: 97 % | HEART RATE: 79 BPM | DIASTOLIC BLOOD PRESSURE: 66 MMHG

## 2019-01-17 PROBLEM — M87.051 AVASCULAR NECROSIS OF RIGHT FEMORAL HEAD (HCC): Status: ACTIVE | Noted: 2019-01-17

## 2019-01-17 LAB
HCT VFR BLD CALC: 27.1 % (ref 36.3–47.1)
HEMOGLOBIN: 8.4 G/DL (ref 11.9–15.1)
SURGICAL PATHOLOGY REPORT: NORMAL

## 2019-01-17 PROCEDURE — 6370000000 HC RX 637 (ALT 250 FOR IP): Performed by: STUDENT IN AN ORGANIZED HEALTH CARE EDUCATION/TRAINING PROGRAM

## 2019-01-17 PROCEDURE — 85014 HEMATOCRIT: CPT

## 2019-01-17 PROCEDURE — 2580000003 HC RX 258: Performed by: STUDENT IN AN ORGANIZED HEALTH CARE EDUCATION/TRAINING PROGRAM

## 2019-01-17 PROCEDURE — 99221 1ST HOSP IP/OBS SF/LOW 40: CPT | Performed by: ORTHOPAEDIC SURGERY

## 2019-01-17 PROCEDURE — 94640 AIRWAY INHALATION TREATMENT: CPT

## 2019-01-17 PROCEDURE — 97530 THERAPEUTIC ACTIVITIES: CPT

## 2019-01-17 PROCEDURE — 36415 COLL VENOUS BLD VENIPUNCTURE: CPT

## 2019-01-17 PROCEDURE — 85018 HEMOGLOBIN: CPT

## 2019-01-17 PROCEDURE — 99239 HOSP IP/OBS DSCHRG MGMT >30: CPT | Performed by: INTERNAL MEDICINE

## 2019-01-17 PROCEDURE — 97110 THERAPEUTIC EXERCISES: CPT

## 2019-01-17 RX ORDER — ALBUTEROL SULFATE 90 UG/1
2 AEROSOL, METERED RESPIRATORY (INHALATION) EVERY 4 HOURS PRN
Qty: 1 INHALER | Refills: 3 | Status: SHIPPED | OUTPATIENT
Start: 2019-01-17

## 2019-01-17 RX ORDER — HYDRALAZINE HYDROCHLORIDE 50 MG/1
50 TABLET, FILM COATED ORAL 2 TIMES DAILY
Qty: 90 TABLET | Refills: 3 | Status: ON HOLD | OUTPATIENT
Start: 2019-01-17 | End: 2021-01-01 | Stop reason: HOSPADM

## 2019-01-17 RX ORDER — LANOLIN ALCOHOL/MO/W.PET/CERES
325 CREAM (GRAM) TOPICAL 2 TIMES DAILY WITH MEALS
Qty: 90 TABLET | Refills: 3 | Status: SHIPPED | OUTPATIENT
Start: 2019-01-17

## 2019-01-17 RX ORDER — POTASSIUM CHLORIDE 1.5 G/1.77G
40 POWDER, FOR SOLUTION ORAL ONCE
Status: COMPLETED | OUTPATIENT
Start: 2019-01-17 | End: 2019-01-17

## 2019-01-17 RX ADMIN — METOPROLOL SUCCINATE 25 MG: 25 TABLET, FILM COATED, EXTENDED RELEASE ORAL at 08:55

## 2019-01-17 RX ADMIN — ACETAMINOPHEN 650 MG: 325 TABLET ORAL at 09:00

## 2019-01-17 RX ADMIN — Medication 10 ML: at 08:55

## 2019-01-17 RX ADMIN — HYDRALAZINE HYDROCHLORIDE 50 MG: 50 TABLET, FILM COATED ORAL at 08:55

## 2019-01-17 RX ADMIN — ACETAMINOPHEN 650 MG: 325 TABLET ORAL at 18:11

## 2019-01-17 RX ADMIN — ASPIRIN 81 MG: 81 TABLET ORAL at 08:55

## 2019-01-17 RX ADMIN — FERROUS SULFATE TAB EC 325 MG (65 MG FE EQUIVALENT) 325 MG: 325 (65 FE) TABLET DELAYED RESPONSE at 16:52

## 2019-01-17 RX ADMIN — ALBUTEROL SULFATE 2 PUFF: 90 AEROSOL, METERED RESPIRATORY (INHALATION) at 09:17

## 2019-01-17 RX ADMIN — METOPROLOL SUCCINATE 25 MG: 25 TABLET, FILM COATED, EXTENDED RELEASE ORAL at 17:06

## 2019-01-17 RX ADMIN — POTASSIUM CHLORIDE 40 MEQ: 1.5 POWDER, FOR SOLUTION ORAL at 09:00

## 2019-01-17 RX ADMIN — FERROUS SULFATE TAB EC 325 MG (65 MG FE EQUIVALENT) 325 MG: 325 (65 FE) TABLET DELAYED RESPONSE at 08:55

## 2019-01-17 ASSESSMENT — PAIN SCALES - GENERAL
PAINLEVEL_OUTOF10: 8
PAINLEVEL_OUTOF10: 4

## 2021-01-01 ENCOUNTER — APPOINTMENT (OUTPATIENT)
Dept: GENERAL RADIOLOGY | Age: 81
DRG: 554 | End: 2021-01-01
Payer: MEDICARE

## 2021-01-01 ENCOUNTER — TELEPHONE (OUTPATIENT)
Dept: ORTHOPEDIC SURGERY | Age: 81
End: 2021-01-01

## 2021-01-01 ENCOUNTER — HOSPITAL ENCOUNTER (INPATIENT)
Age: 81
LOS: 1 days | Discharge: HOME HEALTH | DRG: 554 | End: 2021-04-19
Attending: EMERGENCY MEDICINE | Admitting: INTERNAL MEDICINE
Payer: MEDICARE

## 2021-01-01 VITALS
RESPIRATION RATE: 16 BRPM | TEMPERATURE: 98.1 F | HEART RATE: 48 BPM | BODY MASS INDEX: 29.68 KG/M2 | DIASTOLIC BLOOD PRESSURE: 71 MMHG | WEIGHT: 195.2 LBS | OXYGEN SATURATION: 97 % | SYSTOLIC BLOOD PRESSURE: 127 MMHG

## 2021-01-01 DIAGNOSIS — R53.83 FATIGUE, UNSPECIFIED TYPE: Primary | ICD-10-CM

## 2021-01-01 DIAGNOSIS — M87.051 AVASCULAR NECROSIS OF RIGHT FEMORAL HEAD (HCC): ICD-10-CM

## 2021-01-01 LAB
-: ABNORMAL
ABSOLUTE EOS #: 0.19 K/UL (ref 0–0.44)
ABSOLUTE EOS #: 0.2 K/UL (ref 0–0.44)
ABSOLUTE IMMATURE GRANULOCYTE: <0.03 K/UL (ref 0–0.3)
ABSOLUTE IMMATURE GRANULOCYTE: <0.03 K/UL (ref 0–0.3)
ABSOLUTE LYMPH #: 1.08 K/UL (ref 1.1–3.7)
ABSOLUTE LYMPH #: 1.32 K/UL (ref 1.1–3.7)
ABSOLUTE MONO #: 0.33 K/UL (ref 0.1–1.2)
ABSOLUTE MONO #: 0.36 K/UL (ref 0.1–1.2)
ALBUMIN SERPL-MCNC: 3.4 G/DL (ref 3.5–5.2)
ALBUMIN/GLOBULIN RATIO: 1.2 (ref 1–2.5)
ALP BLD-CCNC: 78 U/L (ref 35–104)
ALT SERPL-CCNC: 15 U/L (ref 5–33)
AMORPHOUS: ABNORMAL
ANION GAP SERPL CALCULATED.3IONS-SCNC: 12 MMOL/L (ref 9–17)
ANION GAP SERPL CALCULATED.3IONS-SCNC: 6 MMOL/L (ref 9–17)
AST SERPL-CCNC: 28 U/L
BACTERIA: ABNORMAL
BASOPHILS # BLD: 1 % (ref 0–2)
BASOPHILS # BLD: 1 % (ref 0–2)
BASOPHILS ABSOLUTE: <0.03 K/UL (ref 0–0.2)
BASOPHILS ABSOLUTE: <0.03 K/UL (ref 0–0.2)
BILIRUB SERPL-MCNC: 0.72 MG/DL (ref 0.3–1.2)
BILIRUBIN URINE: NEGATIVE
BNP INTERPRETATION: ABNORMAL
BUN BLDV-MCNC: 12 MG/DL (ref 8–23)
BUN BLDV-MCNC: 14 MG/DL (ref 8–23)
BUN/CREAT BLD: ABNORMAL (ref 9–20)
BUN/CREAT BLD: ABNORMAL (ref 9–20)
CALCIUM SERPL-MCNC: 10.4 MG/DL (ref 8.6–10.4)
CALCIUM SERPL-MCNC: 9.9 MG/DL (ref 8.6–10.4)
CASTS UA: ABNORMAL /LPF (ref 0–2)
CHLORIDE BLD-SCNC: 107 MMOL/L (ref 98–107)
CHLORIDE BLD-SCNC: 107 MMOL/L (ref 98–107)
CO2: 22 MMOL/L (ref 20–31)
CO2: 26 MMOL/L (ref 20–31)
COLOR: YELLOW
COMMENT UA: ABNORMAL
CREAT SERPL-MCNC: 0.55 MG/DL (ref 0.5–0.9)
CREAT SERPL-MCNC: 0.65 MG/DL (ref 0.5–0.9)
CRYSTALS, UA: ABNORMAL /HPF
DIFFERENTIAL TYPE: ABNORMAL
DIFFERENTIAL TYPE: ABNORMAL
EKG ATRIAL RATE: 83 BPM
EKG P AXIS: 83 DEGREES
EKG P-R INTERVAL: 150 MS
EKG Q-T INTERVAL: 350 MS
EKG QRS DURATION: 72 MS
EKG QTC CALCULATION (BAZETT): 442 MS
EKG R AXIS: -68 DEGREES
EKG T AXIS: 98 DEGREES
EKG VENTRICULAR RATE: 96 BPM
EOSINOPHILS RELATIVE PERCENT: 4 % (ref 1–4)
EOSINOPHILS RELATIVE PERCENT: 5 % (ref 1–4)
EPITHELIAL CELLS UA: ABNORMAL /HPF (ref 0–5)
GFR AFRICAN AMERICAN: >60 ML/MIN
GFR AFRICAN AMERICAN: >60 ML/MIN
GFR NON-AFRICAN AMERICAN: >60 ML/MIN
GFR NON-AFRICAN AMERICAN: >60 ML/MIN
GFR SERPL CREATININE-BSD FRML MDRD: ABNORMAL ML/MIN/{1.73_M2}
GLUCOSE BLD-MCNC: 117 MG/DL (ref 70–99)
GLUCOSE BLD-MCNC: 84 MG/DL (ref 70–99)
GLUCOSE URINE: NEGATIVE
HCT VFR BLD CALC: 37 % (ref 36.3–47.1)
HCT VFR BLD CALC: 39.2 % (ref 36.3–47.1)
HEMOGLOBIN: 11.8 G/DL (ref 11.9–15.1)
HEMOGLOBIN: 12.4 G/DL (ref 11.9–15.1)
IMMATURE GRANULOCYTES: 0 %
IMMATURE GRANULOCYTES: 0 %
KETONES, URINE: NEGATIVE
LEUKOCYTE ESTERASE, URINE: ABNORMAL
LYMPHOCYTES # BLD: 25 % (ref 24–43)
LYMPHOCYTES # BLD: 35 % (ref 24–43)
MAGNESIUM: 2.2 MG/DL (ref 1.6–2.6)
MCH RBC QN AUTO: 30.8 PG (ref 25.2–33.5)
MCH RBC QN AUTO: 31 PG (ref 25.2–33.5)
MCHC RBC AUTO-ENTMCNC: 31.6 G/DL (ref 28.4–34.8)
MCHC RBC AUTO-ENTMCNC: 31.9 G/DL (ref 28.4–34.8)
MCV RBC AUTO: 97.1 FL (ref 82.6–102.9)
MCV RBC AUTO: 97.5 FL (ref 82.6–102.9)
MONOCYTES # BLD: 8 % (ref 3–12)
MONOCYTES # BLD: 9 % (ref 3–12)
MUCUS: ABNORMAL
MYOGLOBIN: 75 NG/ML (ref 25–58)
NITRITE, URINE: NEGATIVE
NRBC AUTOMATED: 0 PER 100 WBC
NRBC AUTOMATED: 0 PER 100 WBC
OTHER OBSERVATIONS UA: ABNORMAL
PDW BLD-RTO: 13.1 % (ref 11.8–14.4)
PDW BLD-RTO: 13.3 % (ref 11.8–14.4)
PH UA: 7 (ref 5–8)
PHOSPHORUS: 2 MG/DL (ref 2.6–4.5)
PLATELET # BLD: ABNORMAL K/UL (ref 138–453)
PLATELET # BLD: ABNORMAL K/UL (ref 138–453)
PLATELET ESTIMATE: ABNORMAL
PLATELET ESTIMATE: ABNORMAL
PLATELET, FLUORESCENCE: 127 K/UL (ref 138–453)
PLATELET, FLUORESCENCE: 148 K/UL (ref 138–453)
PLATELET, IMMATURE FRACTION: 4.3 % (ref 1.1–10.3)
PLATELET, IMMATURE FRACTION: 4.4 % (ref 1.1–10.3)
PMV BLD AUTO: ABNORMAL FL (ref 8.1–13.5)
PMV BLD AUTO: ABNORMAL FL (ref 8.1–13.5)
POTASSIUM SERPL-SCNC: 3.6 MMOL/L (ref 3.7–5.3)
POTASSIUM SERPL-SCNC: 3.8 MMOL/L (ref 3.7–5.3)
PRO-BNP: 1166 PG/ML
PROTEIN UA: NEGATIVE
RBC # BLD: 3.81 M/UL (ref 3.95–5.11)
RBC # BLD: 4.02 M/UL (ref 3.95–5.11)
RBC # BLD: ABNORMAL 10*6/UL
RBC # BLD: ABNORMAL 10*6/UL
RBC UA: ABNORMAL /HPF (ref 0–2)
RENAL EPITHELIAL, UA: ABNORMAL /HPF
SARS-COV-2, RAPID: NOT DETECTED
SEG NEUTROPHILS: 50 % (ref 36–65)
SEG NEUTROPHILS: 62 % (ref 36–65)
SEGMENTED NEUTROPHILS ABSOLUTE COUNT: 1.86 K/UL (ref 1.5–8.1)
SEGMENTED NEUTROPHILS ABSOLUTE COUNT: 2.75 K/UL (ref 1.5–8.1)
SODIUM BLD-SCNC: 139 MMOL/L (ref 135–144)
SODIUM BLD-SCNC: 141 MMOL/L (ref 135–144)
SPECIFIC GRAVITY UA: 1.01 (ref 1–1.03)
SPECIMEN DESCRIPTION: NORMAL
THYROXINE, FREE: 1.16 NG/DL (ref 0.93–1.7)
TOTAL CK: 176 U/L (ref 26–192)
TOTAL PROTEIN: 6.3 G/DL (ref 6.4–8.3)
TRICHOMONAS: ABNORMAL
TROPONIN INTERP: ABNORMAL
TROPONIN INTERP: ABNORMAL
TROPONIN T: ABNORMAL NG/ML
TROPONIN T: ABNORMAL NG/ML
TROPONIN, HIGH SENSITIVITY: 25 NG/L (ref 0–14)
TROPONIN, HIGH SENSITIVITY: 28 NG/L (ref 0–14)
TURBIDITY: ABNORMAL
URINE HGB: NEGATIVE
UROBILINOGEN, URINE: NORMAL
WBC # BLD: 3.7 K/UL (ref 3.5–11.3)
WBC # BLD: 4.4 K/UL (ref 3.5–11.3)
WBC # BLD: ABNORMAL 10*3/UL
WBC # BLD: ABNORMAL 10*3/UL
WBC UA: ABNORMAL /HPF (ref 0–5)
YEAST: ABNORMAL

## 2021-01-01 PROCEDURE — 83735 ASSAY OF MAGNESIUM: CPT

## 2021-01-01 PROCEDURE — 73502 X-RAY EXAM HIP UNI 2-3 VIEWS: CPT

## 2021-01-01 PROCEDURE — 83874 ASSAY OF MYOGLOBIN: CPT

## 2021-01-01 PROCEDURE — 81001 URINALYSIS AUTO W/SCOPE: CPT

## 2021-01-01 PROCEDURE — 84439 ASSAY OF FREE THYROXINE: CPT

## 2021-01-01 PROCEDURE — 6370000000 HC RX 637 (ALT 250 FOR IP): Performed by: STUDENT IN AN ORGANIZED HEALTH CARE EDUCATION/TRAINING PROGRAM

## 2021-01-01 PROCEDURE — 85055 RETICULATED PLATELET ASSAY: CPT

## 2021-01-01 PROCEDURE — 6360000002 HC RX W HCPCS: Performed by: STUDENT IN AN ORGANIZED HEALTH CARE EDUCATION/TRAINING PROGRAM

## 2021-01-01 PROCEDURE — 87635 SARS-COV-2 COVID-19 AMP PRB: CPT

## 2021-01-01 PROCEDURE — 93005 ELECTROCARDIOGRAM TRACING: CPT | Performed by: STUDENT IN AN ORGANIZED HEALTH CARE EDUCATION/TRAINING PROGRAM

## 2021-01-01 PROCEDURE — 85025 COMPLETE CBC W/AUTO DIFF WBC: CPT

## 2021-01-01 PROCEDURE — 80053 COMPREHEN METABOLIC PANEL: CPT

## 2021-01-01 PROCEDURE — 82550 ASSAY OF CK (CPK): CPT

## 2021-01-01 PROCEDURE — 99223 1ST HOSP IP/OBS HIGH 75: CPT | Performed by: INTERNAL MEDICINE

## 2021-01-01 PROCEDURE — 1200000000 HC SEMI PRIVATE

## 2021-01-01 PROCEDURE — 97167 OT EVAL HIGH COMPLEX 60 MIN: CPT

## 2021-01-01 PROCEDURE — 93010 ELECTROCARDIOGRAM REPORT: CPT | Performed by: INTERNAL MEDICINE

## 2021-01-01 PROCEDURE — 2580000003 HC RX 258: Performed by: STUDENT IN AN ORGANIZED HEALTH CARE EDUCATION/TRAINING PROGRAM

## 2021-01-01 PROCEDURE — 71045 X-RAY EXAM CHEST 1 VIEW: CPT

## 2021-01-01 PROCEDURE — 99284 EMERGENCY DEPT VISIT MOD MDM: CPT

## 2021-01-01 PROCEDURE — 97535 SELF CARE MNGMENT TRAINING: CPT

## 2021-01-01 PROCEDURE — 84100 ASSAY OF PHOSPHORUS: CPT

## 2021-01-01 PROCEDURE — 83880 ASSAY OF NATRIURETIC PEPTIDE: CPT

## 2021-01-01 PROCEDURE — 84484 ASSAY OF TROPONIN QUANT: CPT

## 2021-01-01 PROCEDURE — 80048 BASIC METABOLIC PNL TOTAL CA: CPT

## 2021-01-01 RX ORDER — HEPARIN SODIUM 5000 [USP'U]/ML
5000 INJECTION, SOLUTION INTRAVENOUS; SUBCUTANEOUS EVERY 8 HOURS SCHEDULED
Status: DISCONTINUED | OUTPATIENT
Start: 2021-01-01 | End: 2021-01-01 | Stop reason: HOSPADM

## 2021-01-01 RX ORDER — AMLODIPINE BESYLATE 10 MG/1
5 TABLET ORAL DAILY
Status: DISCONTINUED | OUTPATIENT
Start: 2021-01-01 | End: 2021-01-01 | Stop reason: HOSPADM

## 2021-01-01 RX ORDER — METOPROLOL SUCCINATE 25 MG/1
25 TABLET, EXTENDED RELEASE ORAL DAILY
Qty: 30 TABLET | Refills: 3 | Status: SHIPPED | OUTPATIENT
Start: 2021-01-01

## 2021-01-01 RX ORDER — METOPROLOL SUCCINATE 25 MG/1
25 TABLET, EXTENDED RELEASE ORAL 2 TIMES DAILY
Status: DISCONTINUED | OUTPATIENT
Start: 2021-01-01 | End: 2021-01-01 | Stop reason: HOSPADM

## 2021-01-01 RX ORDER — ASPIRIN 81 MG/1
81 TABLET ORAL DAILY
Status: DISCONTINUED | OUTPATIENT
Start: 2021-01-01 | End: 2021-01-01 | Stop reason: HOSPADM

## 2021-01-01 RX ORDER — IPRATROPIUM BROMIDE AND ALBUTEROL SULFATE 2.5; .5 MG/3ML; MG/3ML
1 SOLUTION RESPIRATORY (INHALATION) EVERY 4 HOURS PRN
Status: DISCONTINUED | OUTPATIENT
Start: 2021-01-01 | End: 2021-01-01 | Stop reason: HOSPADM

## 2021-01-01 RX ORDER — ONDANSETRON 2 MG/ML
4 INJECTION INTRAMUSCULAR; INTRAVENOUS EVERY 6 HOURS PRN
Status: DISCONTINUED | OUTPATIENT
Start: 2021-01-01 | End: 2021-01-01 | Stop reason: HOSPADM

## 2021-01-01 RX ORDER — SODIUM CHLORIDE 9 MG/ML
25 INJECTION, SOLUTION INTRAVENOUS PRN
Status: DISCONTINUED | OUTPATIENT
Start: 2021-01-01 | End: 2021-01-01 | Stop reason: HOSPADM

## 2021-01-01 RX ORDER — FUROSEMIDE 20 MG/1
20 TABLET ORAL ONCE
Status: COMPLETED | OUTPATIENT
Start: 2021-01-01 | End: 2021-01-01

## 2021-01-01 RX ORDER — ACETAMINOPHEN 650 MG/1
650 SUPPOSITORY RECTAL EVERY 6 HOURS PRN
Status: DISCONTINUED | OUTPATIENT
Start: 2021-01-01 | End: 2021-01-01 | Stop reason: HOSPADM

## 2021-01-01 RX ORDER — LANOLIN ALCOHOL/MO/W.PET/CERES
3 CREAM (GRAM) TOPICAL ONCE
Status: DISCONTINUED | OUTPATIENT
Start: 2021-01-01 | End: 2021-01-01 | Stop reason: HOSPADM

## 2021-01-01 RX ORDER — SODIUM CHLORIDE 0.9 % (FLUSH) 0.9 %
10 SYRINGE (ML) INJECTION PRN
Status: DISCONTINUED | OUTPATIENT
Start: 2021-01-01 | End: 2021-01-01 | Stop reason: HOSPADM

## 2021-01-01 RX ORDER — PROMETHAZINE HYDROCHLORIDE 12.5 MG/1
12.5 TABLET ORAL EVERY 6 HOURS PRN
Status: DISCONTINUED | OUTPATIENT
Start: 2021-01-01 | End: 2021-01-01 | Stop reason: HOSPADM

## 2021-01-01 RX ORDER — AMLODIPINE BESYLATE 5 MG/1
5 TABLET ORAL DAILY
Qty: 30 TABLET | Refills: 3 | Status: SHIPPED | OUTPATIENT
Start: 2021-01-01

## 2021-01-01 RX ORDER — SODIUM CHLORIDE 0.9 % (FLUSH) 0.9 %
10 SYRINGE (ML) INJECTION EVERY 12 HOURS SCHEDULED
Status: DISCONTINUED | OUTPATIENT
Start: 2021-01-01 | End: 2021-01-01 | Stop reason: HOSPADM

## 2021-01-01 RX ORDER — FUROSEMIDE 10 MG/ML
20 INJECTION INTRAMUSCULAR; INTRAVENOUS ONCE
Status: CANCELLED | OUTPATIENT
Start: 2021-01-01 | End: 2021-01-01

## 2021-01-01 RX ORDER — ACETAMINOPHEN 325 MG/1
650 TABLET ORAL EVERY 6 HOURS PRN
Status: DISCONTINUED | OUTPATIENT
Start: 2021-01-01 | End: 2021-01-01 | Stop reason: HOSPADM

## 2021-01-01 RX ORDER — POLYETHYLENE GLYCOL 3350 17 G/17G
17 POWDER, FOR SOLUTION ORAL DAILY PRN
Status: DISCONTINUED | OUTPATIENT
Start: 2021-01-01 | End: 2021-01-01 | Stop reason: HOSPADM

## 2021-01-01 RX ADMIN — Medication 81 MG: at 13:56

## 2021-01-01 RX ADMIN — FUROSEMIDE 20 MG: 20 TABLET ORAL at 23:29

## 2021-01-01 RX ADMIN — METOPROLOL SUCCINATE 25 MG: 25 TABLET, FILM COATED, EXTENDED RELEASE ORAL at 23:29

## 2021-01-01 RX ADMIN — AMLODIPINE BESYLATE 5 MG: 10 TABLET ORAL at 05:53

## 2021-01-01 RX ADMIN — SODIUM CHLORIDE, PRESERVATIVE FREE 10 ML: 5 INJECTION INTRAVENOUS at 14:32

## 2021-01-01 RX ADMIN — HEPARIN SODIUM 5000 UNITS: 5000 INJECTION INTRAVENOUS; SUBCUTANEOUS at 05:56

## 2021-01-01 RX ADMIN — ACETAMINOPHEN 650 MG: 325 TABLET ORAL at 23:29

## 2021-01-01 ASSESSMENT — ENCOUNTER SYMPTOMS
SHORTNESS OF BREATH: 0
EYE DISCHARGE: 0
EYE REDNESS: 0
ABDOMINAL PAIN: 0
DIARRHEA: 0
SHORTNESS OF BREATH: 1
COUGH: 0
VOMITING: 0
COLOR CHANGE: 0

## 2021-01-01 ASSESSMENT — PAIN SCALES - GENERAL
PAINLEVEL_OUTOF10: 5
PAINLEVEL_OUTOF10: 0
PAINLEVEL_OUTOF10: 0

## 2021-04-18 PROBLEM — R06.09 DYSPNEA ON EXERTION: Status: ACTIVE | Noted: 2021-01-01

## 2021-04-18 PROBLEM — I50.9 CONGESTIVE HEART FAILURE (CHF) (HCC): Status: ACTIVE | Noted: 2021-01-01

## 2021-04-18 NOTE — ED NOTES
Several superficial bed sores noted to buttocks. No drainage or blood noted.        Melissa Velazquez, RN  04/18/21 1944

## 2021-04-18 NOTE — ED NOTES
Report received from [email or web address]. Pt placed on bedpan. Urine sample obtained.  Labeled and sent to lab     Marija Teran RN  04/18/21 5050

## 2021-04-18 NOTE — ED NOTES
Patient stated she lives with daughter & granddaughter in 2 story home. Patient stated first story does not have a bathroom, bedroom & bathroom are on second story. Patient stated she is able to go down steps but has a very difficult time going up them. Patient stated 2 days ago she went downstairs & was unable to get up. Patient stated she laid on the couch for 2 days while her family used plastic buckets for toileting of patient. Patient stated today her daughter called 911 for assistance. Patient stated her daughter has a very soft, high voice which most people take for a child's voice on the phone so she took the phone & spoke with the . Patient stated she feels safe in the home, denied concerns for abuse or neglect. Patient stated she has an older worn out 2 wheeled walker that expands when she uses it & would like a new rollator walker. Patient stated preference for discharge home & would like PT/OT home care. Patient stated EMS told her there's something she can put on the stair steps to help her go up them & she'd like that. Patient stated she has 3 grab bars in the shower, would like a handheld showerhead & shower chair. Patient stated she has once-daily delivered meals via [place]. Patient stated preference for standard wheelchair. Patient requested assistance obtaining new refrigerator. Patient denied additional needs/questions/concerns.       RAFAELA Azul  04/18/21 0905

## 2021-04-18 NOTE — ED PROVIDER NOTES
[facility] ED  Emergency Department Encounter  Emergency Medicine Resident     Pt Name: Cecy Damon  MRN: [id number]  [place] 1940  Date of evaluation: 4/18/21  PCP:  PEBBLES Daniels CNP    CHIEF COMPLAINT       Chief Complaint   Patient presents with    Other     [facility] was called for lift assistance. pt unable to care for self. HISTORY OFPRESENT ILLNESS  (Location/Symptom, Timing/Onset, Context/Setting, Quality, Duration, Modifying Factors,Severity.)      Cecy aDmon is a 68 y.o. female who presents with generalized fatigue and worsening weakness. Patient reportedly lives in the basement with her family members who live on the first floor. She states she was able to ambulate down the stairs however has been unable to walk back up the stairs for the past 3 days. She states she has been unable to get up off of the couch today. Denying any complaints currently and fatigue is ongoing when trying to ambulate any distance. Denies any chest pain, syncope, nausea, vomiting, cough, fever. PAST MEDICAL / SURGICAL / SOCIAL / FAMILY HISTORY      has a past medical history of Arthritis, Asthma, Hyperlipidemia, Hypertension, and Pulmonary embolism ([facility] [address] has a past surgical history that includes Rotator cuff repair (Left); Colonoscopy; Colonoscopy (N/A, 1/15/2019); Upper gastrointestinal endoscopy (N/A, 1/15/2019); and Colonoscopy (1/16/2019). Social History     Socioeconomic History    Marital status:       Spouse name: Not on file    Number of children: Not on file    Years of education: Not on file    Highest education level: Not on file   Occupational History    Not on file   Social Needs    Financial resource strain: Not on file    Food insecurity     Worry: Not on file     Inability: Not on file    Transportation needs     Medical: Not on file     Non-medical: Not on file   Tobacco Use    Smoking status: Former Smoker     Packs/day: 1.00    Smokeless tobacco: Never Used    Tobacco comment: smoked 16 years  none in 42 years   Substance and Sexual Activity    Alcohol use: No    Drug use: No    Sexual activity: Never   Lifestyle    Physical activity     Days per week: Not on file     Minutes per session: Not on file    Stress: Not on file   Relationships    Social connections     Talks on phone: Not on file     Gets together: Not on file     Attends [employer] service: Not on file     Active member of club or organization: Not on file     Attends meetings of clubs or organizations: Not on file     Relationship status: Not on file    Intimate partner violence     Fear of current or ex partner: Not on file     Emotionally abused: Not on file     Physically abused: Not on file     Forced sexual activity: Not on file   Other Topics Concern    Not on file   Social History Narrative    Not on file       History reviewed. No pertinent family history. Allergies:  Morphine and Sulfa antibiotics    Home Medications:  Prior to Admission medications    Medication Sig Start Date End Date Taking?  Authorizing Provider   hydrALAZINE (APRESOLINE) 50 MG tablet Take 1 tablet by mouth 2 times daily 1/17/19   Tamica Gavin MD   ferrous sulfate 325 (65 Fe) MG EC tablet Take 1 tablet by mouth 2 times daily (with meals) 1/17/19   Tamica Gavin MD   albuterol sulfate  (90 Base) MCG/ACT inhaler Inhale 2 puffs into the lungs every 4 hours as needed for Wheezing 1/17/19   Gonsalo Ewing MD   metoprolol succinate (TOPROL XL) 25 MG extended release tablet Take 25 mg by mouth 2 times daily    Historical Provider, MD   potassium chloride (MICRO-K) 10 MEQ extended release capsule Take 20 mEq by mouth 2 times daily     Historical Provider, MD   montelukast (SINGULAIR) 10 MG tablet Take 5 mg by mouth nightly     Historical Provider, MD   aspirin 81 MG EC tablet Take 81 mg by mouth daily    Historical Provider, MD       REVIEW OF SYSTEMS    (2-9 systems for level 4, 10 or more for level 5)      Review of Systems   Constitutional: Positive for fatigue. Negative for chills and fever. Eyes: Negative for discharge and redness. Respiratory: Negative for shortness of breath. Cardiovascular: Negative for chest pain. Gastrointestinal: Negative for abdominal pain. Genitourinary: Negative for flank pain. Musculoskeletal: Negative for myalgias. Skin: Negative for color change and rash. Allergic/Immunologic: Positive for environmental allergies. Neurological: Positive for weakness. Negative for headaches. Psychiatric/Behavioral: Negative for agitation and confusion. PHYSICAL EXAM   (up to 7 for level 4, 8 or more for level 5)     INITIAL VITALS:    weight is 250 lb (113.4 kg). Her oral temperature is 97.5 °F (36.4 °C). Her blood pressure is 126/75 and her pulse is 69. Her respiration is 16 and oxygen saturation is 99%. Physical Exam  Vitals signs and nursing note reviewed. Constitutional:       Appearance: She is well-developed. HENT:      Head: Normocephalic and atraumatic. Nose: Nose normal.      Mouth/Throat:      Mouth: Mucous membranes are dry. Eyes:      General: No scleral icterus. Conjunctiva/sclera: Conjunctivae normal.      Pupils: Pupils are equal, round, and reactive to light. Neck:      Musculoskeletal: Neck supple. Trachea: No tracheal deviation. Cardiovascular:      Rate and Rhythm: Normal rate and regular rhythm. Heart sounds: Normal heart sounds. No murmur. No friction rub. No gallop. Pulmonary:      Effort: Pulmonary effort is normal. No respiratory distress. Breath sounds: Normal breath sounds. No wheezing or rales. Abdominal:      General: Bowel sounds are normal. There is no distension. Palpations: Abdomen is soft. There is no mass. Tenderness: There is no abdominal tenderness. There is no guarding or rebound.    Musculoskeletal:      Comments: 1+ pitting edema to bilateral lower extremity to the level of the ankles, pulses intact distally, 2/4  Decreased range of motion to left and right hip. Skin:     General: Skin is warm and dry. Findings: No erythema or rash. Neurological:      Mental Status: She is alert and oriented to person, place, and time. Psychiatric:         Behavior: Behavior normal.         DIFFERENTIAL  DIAGNOSIS     PLAN (LABS / IMAGING / EKG):  Orders Placed This Encounter   Procedures    COVID-19, Rapid    XR CHEST PORTABLE    XR HIP 2-3 VW W PELVIS RIGHT    CK    MYOGLOBIN, SERUM    CBC Auto Differential    Comprehensive Metabolic Panel    T4, FREE    Urinalysis Reflex to Culture    Troponin    Brain Natriuretic Peptide    Magnesium    Phosphorus    Immature Platelet Fraction    Inpatient consult to Internal Medicine    EKG 12 Lead    PATIENT STATUS (FROM ED OR OR/PROCEDURAL) Inpatient       MEDICATIONS ORDERED:  No orders of the defined types were placed in this encounter. DDX: Malnutrition versus neglect versus rhabdomyolysis versus CHF versus hypothyroidism    Initial MDM/Plan: 68 y.o. female who presents with worsening fatigue. Unable to ambulate from seated position. Unable to ambulate upstairs. We will get extensive laboratory work-up. Dissipate admission.     DIAGNOSTIC RESULTS / EMERGENCY DEPARTMENT COURSE / MDM     LABS:  Labs Reviewed   MYOGLOBIN, SERUM - Abnormal; Notable for the following components:       Result Value    Myoglobin 75 (*)     All other components within normal limits   CBC WITH AUTO DIFFERENTIAL - Abnormal; Notable for the following components:    Absolute Lymph # 1.08 (*)     All other components within normal limits   COMPREHENSIVE METABOLIC PANEL - Abnormal; Notable for the following components:    Glucose 117 (*)     Total Protein 6.3 (*)     Albumin 3.4 (*)     All other components within normal limits   TROPONIN - Abnormal; Notable for the following components:    Troponin, High Sensitivity 28 (*)     All other components within normal limits   TROPONIN - Abnormal; Notable for the following components:    Troponin, High Sensitivity 25 (*)     All other components within normal limits   BRAIN NATRIURETIC PEPTIDE - Abnormal; Notable for the following components:    Pro-BNP 1,166 (*)     All other components within normal limits   PHOSPHORUS - Abnormal; Notable for the following components:    Phosphorus 2.0 (*)     All other components within normal limits   COVID-19, RAPID   CK   T4, FREE   MAGNESIUM   IMMATURE PLATELET FRACTION   URINE RT REFLEX TO CULTURE         RADIOLOGY:  Xr Chest Portable    Result Date: 4/18/2021  EXAMINATION: ONE XRAY VIEW OF THE CHEST 4/18/2021 4:22 pm COMPARISON: January 14, 2019 HISTORY: ORDERING SYSTEM PROVIDED HISTORY: CP TECHNOLOGIST PROVIDED HISTORY: CP FINDINGS: Severe cardiomegaly. Mediastinum normal.  Lungs are clear. Degenerative changes are noted in the shoulders. Severe cardiomegaly. No acute disease. Xr Hip 2-3 Vw W Pelvis Right    Result Date: 4/18/2021  EXAMINATION: ONE XRAY VIEW OF THE PELVIS AND TWO XRAY VIEWS RIGHT HIP 4/18/2021 4:22 pm COMPARISON: Right hip radiographs performed 01/16/2019. HISTORY: ORDERING SYSTEM PROVIDED HISTORY: History of avascular necrosis, decreased strength TECHNOLOGIST PROVIDED HISTORY: History of avascular necrosis, decreased strength FINDINGS: The bony pelvis is intact. There is changes of avascular necrosis involving the proximal femoral heads bilaterally. There is degenerative change of the lower lumbar spine. There is degenerative change of the SI joints. There is severe degenerative change of the hip joints. The surrounding soft tissues are unremarkable. No acute osseous or soft tissue abnormality. Changes of avascular necrosis involving the femoral heads. Severe degenerative changes of the SI joints and hip joints.        EKG  EKG Interpretation     Interpreted by me     Rhythm: normal sinus   Rate: normal  Axis: Left  Ectopy: none  Conduction: PVCs and runs of ventricular tachycardia  ST Segments: no acute change  T Waves: no acute change  Q Waves: Inferior, septal     Clinical Impression: Ventricular ectopy, prior MI, no acute changes    All EKG's are interpreted by the Emergency Department Physician who either signs or Co-signs this chart in the absence of a cardiologist.    EMERGENCY DEPARTMENT COURSE:  ED Course as of Apr 18 1845   Sun Apr 18, 2021   1814 Discussed with medicine agreeable to admission. [MS]      ED Course User Index  [MS] Jon Flores DO     Concern for patient unable to care for himself. No family in home that EMS is aware of. Elevated BNP as well as mild signs of fluid overload as well as severe cardiomegaly on chest x-ray. Will admit for echo. PT OT. Discussed with internal medicine agreeable to admission. PROCEDURES:  None    CONSULTS:  IP CONSULT TO INTERNAL MEDICINE  IP CONSULT TO CASE MANAGEMENT    CRITICAL CARE:  Please see attending note    FINAL IMPRESSION      1. Fatigue, unspecified type          DISPOSITION / PLAN     DISPOSITION Admitted 04/18/2021 06:14:41 PM        PATIENTREFERRED TO:  No follow-up provider specified.     DISCHARGE MEDICATIONS:  New Prescriptions    No medications on file       Jon Flores DO  EmergencyMedicine Resident    (Please note that portions of this note were completed with a voice recognition program.  Efforts were made to edit the dictations but occasionally words are mis-transcribed.)       Jon Flores DO  Resident  04/18/21 7136

## 2021-04-18 NOTE — ED PROVIDER NOTES
[facility]  Emergency Department  Faculty Attestation     I performed a history and physical examination of the patient and discussed management with the resident. I reviewed the residents note and agree with the documented findings and plan of care. Any areas of disagreement are noted on the chart. I was personally present for the key portions of any procedures. I have documented in the chart those procedures where I was not present during the key portions. I have reviewed the emergency nurses triage note. I agree with the chief complaint, past medical history, past surgical history, allergies, medications, social and family history as documented unless otherwise noted below. For Physician Assistant/ Nurse Practitioner cases/documentation I have personally evaluated this patient and have completed at least one if not all key elements of the E/M (history, physical exam, and MDM). Additional findings are as noted. Primary Care Physician:  PEBBLES Morales CNP    Screenings:  [unfilled]    CHIEF COMPLAINT       Chief Complaint   Patient presents with    Other     [facility] was called for lift assistance. pt unable to care for self. RECENT VITALS:    ,  Pulse: 69, Resp: 16, BP: 126/75    LABS:  Labs Reviewed   CK   MYOGLOBIN, SERUM   CBC WITH AUTO DIFFERENTIAL   COMPREHENSIVE METABOLIC PANEL   T4, FREE   URINE RT REFLEX TO CULTURE   TROPONIN   TROPONIN   BRAIN NATRIURETIC PEPTIDE   MAGNESIUM   PHOSPHORUS       Radiology  XR CHEST PORTABLE    (Results Pending)       CRITICAL CARE: There was a high probability of clinically significant/life threatening deterioration in this patient's condition which required my urgent intervention. Total critical care time was none minutes. This excludes any time for separately reportable procedures.      EKG:  EKG Interpretation    Interpreted by me    Rhythm: normal sinus   Rate: normal  Axis: Left  Ectopy: none  Conduction: PVCs and runs of ventricular tachycardia  ST Segments: no acute change  T Waves: no acute change  Q Waves: Inferior, septal    Clinical Impression: Ventricular ectopy, prior MI, no acute changes    Attending Physician Additional  Notes    This was called to help lift the patient. She states that she went downstairs 2 days ago and has been unable to get up since then. She says she has been drinking plenty of fluids in fact she had 5 bottles of water today. She states her family normally brings her meals. Is uncertain whether the floor caring for her today. She denies trauma or falling. She denies pain such as in her lower extremities head neck chest abdomen. No vomiting. She has occasional brief nonradiating chest discomfort pain but no shortness of breath sweats or radiation. No palpitations. No UTI symptoms. There is mild thirst.  She has diffuse osteoarthritis in multiple joints. Uncertain whether she is getting her daily medications. On exam she is nontoxic afebrile vital signs normal GCS is 15. Normal pupils extraocular meds. Face is symmetrical.  Mouth is slightly dry. Face is midline no obvious cranial nerve abnormalities. Upper extremity strength is 5/5. She is unable to extend her knees or lift up either lower extremity. This appears to be more from contracture. I am unable to flex extend or internally or externally rotate the hips or extend the knees. Both knees have small effusions as to the wrists and ankles but no warmth or erythema. Pulses are intact. There is minimal edema. No skin breakdown on her backside. Abdomen is soft and nontender. Impression is inability to ambulate, lower extremity weakness, diffuse arthritis, possible dehydration versus malnutrition, consider UTI, probable flexion contractures of the lower extremities. Plan is laboratory studies, imaging, IV fluids, analgesics, reassess, anticipate admission for PT OT rehab and possibly placement.             Nai Joy

## 2021-04-18 NOTE — ED NOTES
Pt presented to ED with complaints of leg pain. Pt called [facility] for lift assistance. Pt was found laying on the couch with no one at home to help her. Pt unable to ambulate. Pt denies falls. Pt denies any complaints. Pt states laying on couch for 4 days without moving. Pt alert and oriented. Pt speaks in full sentences.       Marlin Ferro RN  04/18/21 Cris Hernandez RN  04/18/21 3025

## 2021-04-19 PROBLEM — I50.32 CHRONIC DIASTOLIC CONGESTIVE HEART FAILURE (HCC): Status: ACTIVE | Noted: 2021-01-01

## 2021-04-19 NOTE — PLAN OF CARE
Patient will follow up with [facility] podiatry clinic upon discharge for routine foot care.  Discussed with medicine team.     Gina Delaney DPM   Podiatric Medicine & Surgery   4/19/2021 at 10:14 AM

## 2021-04-19 NOTE — FLOWSHEET NOTE
Assessment: The patient was calm and approachable. Intervention:  engaged in active listening.  asked if they would like prayer and informed them chaplains are available 24/7. Outcome: They engaged in the conversation. Chaplains will remain available to offer spiritual and emotional support as needed.        04/19/21 1527   Encounter Summary   Services provided to: Patient   Referral/Consult From: [facility]   (04/19/21)   Complexity of Encounter Moderate   Length of Encounter 15 minutes   Spiritual Assessment Completed Yes   Routine   Type Initial   Assessment Approachable;Calm   Intervention Active listening   Outcome Engaged in conversation

## 2021-04-19 NOTE — ED NOTES
Pt resting in bed, NAD noted rr even and non labored. Bed locked in lowest position, environment free of clutter. Call light within reach, will continue to monitor.        Tom Johnson RN  04/19/21 7636

## 2021-04-19 NOTE — ED NOTES
Pt resting in bed. NAD at this time.  Will continue to monitor  Call light in reach     Francesca Caceres RN  04/19/21 0185

## 2021-04-19 NOTE — ED NOTES
Pt resting in bed. NAD at this time. Even non labored RR. Will continue to monitor.         Regis Montoya RN  04/19/21 0039

## 2021-04-19 NOTE — ED NOTES
Pt resting in bed. NAD at this time.  Will continue to monitor       Aleja Johnson RN  04/19/21 7934

## 2021-04-19 NOTE — DISCHARGE INSTR - COC
Continuity of Care Form    Patient Name: Casey Herrera   :  1940  MRN:  [id number]    Admit date:  2021  Discharge date:  2021     Code Status Order: Full Code   Advance Directives:      Admitting Physician:  Lola Malone MD  PCP: Rosy Bobo, APRN - CNP    Discharging Nurse: Suzanne Lee, RN  Discharging Hospital Unit/Room#: 8057/4588-48  Discharging Unit Phone Number: [phone]    Emergency Contact:   Extended Emergency Contact Information  Primary Emergency Contact: [facility] [address] [facility] Phone: [phone]  Relation: Child  Secondary Emergency Contact: Tabitha Phillips   [address] Phone: [phone]  Relation: Child    Past Surgical History:  Past Surgical History:   Procedure Laterality Date    COLONOSCOPY      COLONOSCOPY N/A 1/15/2019    COLONOSCOPY DIAGNOSTIC performed by Bertie Denver, MD at [facility]    COLONOSCOPY  2019    COLONOSCOPY POLYPECTOMY SNARE/COLD BIOPSY performed by Bertie Denver, MD at [address] [place] [address] Left     UPPER GASTROINTESTINAL ENDOSCOPY N/A 1/15/2019    EGD BIOPSY performed by Bertie Denver, MD at [facility]       Immunization History: There is no immunization history on file for this patient.     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) M87.051    Dyspnea on exertion R06.00    Chronic diastolic congestive heart failure (HCC) I50.32       Isolation/Infection:   Isolation            No Isolation          Patient Infection Status       Infection Onset Added Last Indicated Last Indicated By Review Planned Expiration Resolved Resolved By    None active    Resolved    COVID-19 Rule Out 21 COVID-19, Rapid (Ordered)   21 Rule-Out Test Resulted            Nurse Assessment:  Last Vital Signs: /81   Pulse 67   Temp 98.1 °F (36.7 °C) (Oral)   Resp 16   Wt 195 lb 3.2 oz (88.5 kg)   SpO2 97%   BMI 29.68 kg/m²     Last documented pain score (0-10 scale): Pain Level: 0  Last Weight:   Wt Readings from Last 1 Encounters:   04/19/21 195 lb 3.2 oz (88.5 kg)     Mental Status:  oriented and alert    IV Access:  - None    Nursing Mobility/ADLs:  Walking   Assisted  Transfer  Assisted  Bathing  Assisted  Dressing  Assisted  Toileting  Assisted  Feeding  Assisted  Med Admin  Assisted  Med Delivery   whole    Wound Care Documentation and Therapy:        Elimination:  Continence: Bowel: No  Bladder: Yes  Urinary Catheter: None   Colostomy/Ileostomy/Ileal Conduit: No       Date of Last BM: 04/18/2021    Intake/Output Summary (Last 24 hours) at 4/19/2021 1218  Last data filed at 4/19/2021 0435  Gross per 24 hour   Intake --   Output 400 ml   Net -400 ml     I/O last 3 completed shifts:  In: -   Out: 400 [Urine:400]    Safety Concerns:     History of Falls (last 30 days) and At Risk for Falls    Impairments/Disabilities:      None    Nutrition Therapy:  Current Nutrition Therapy:   - Oral Diet:  Low Sodium (3-4gm)    Routes of Feeding: Oral  Liquids: No Restrictions  Daily Fluid Restriction: no  Last Modified Barium Swallow with Video (Video Swallowing Test): not done    Treatments at the Time of Hospital Discharge:   Respiratory Treatments: NA  Oxygen Therapy:  is not on home oxygen therapy.   Ventilator:    - No ventilator support    Rehab Therapies: Physical Therapy and Occupational Therapy  Weight Bearing Status/Restrictions: No weight bearing restirctions  Other Medical Equipment (for information only, NOT a DME order):  wheelchair, walker, bath bench, bedside commode, and hospital bed  Other Treatments: NA    Patient's personal belongings (please select all that are sent with patient):  None    RN SIGNATURE:  Electronically signed by Delmer Contreras RN on 4/19/21 at 5:05 PM EDT    CASE MANAGEMENT/SOCIAL WORK SECTION    Inpatient Status Date: ***    Readmission Risk Assessment Score:  Readmission Risk              Risk of Unplanned Readmission:        11           Discharging to Facility/ Agency   Name: [facility]  Address:   [address] [address]         Phone: [phone]          Phone:  Fax:    Medical Equipment  Name: Chato Martinez  Phone: [phone]  *call when you get home for equipment delivery*    / signature: Electronically signed by Kiesha Sellers RN on 4/19/21 at 4:09 PM EDT    PHYSICIAN SECTION    Prognosis: Good    Condition at Discharge: Stable    Rehab Potential (if transferring to Rehab): Good    Recommended Labs or Other Treatments After Discharge:   Monitor BP at home  Stop Hydralazine and start Norvasc 5 mg daily  Changed Toprol XL 25 mg to once daily  Continue other medications  Follow up with PCP in 1 week. Can get ECHO out-patient    Home care. PT/OT. Help with ADL  Follow up with Dr Christi Perez, Orthopedics in 1 week for possible right hip total arthroplasty. Call to make appointment    Follow up with [facility] podiatry clinic upon discharge for routine foot care. Call for appointment    Physician Certification: I certify the above information and transfer of Kylee Witt  is necessary for the continuing treatment of the diagnosis listed and that she requires Home Care for greater 30 days.      Update Admission H&P: see discharge summary    PHYSICIAN SIGNATURE:  Electronically signed by Timur Wade MD on 4/19/21 at 12:18 PM EDT

## 2021-04-19 NOTE — ED NOTES
Pt resting in bed. NAD at this time. Even non labored RR. Family at bedside. Call light in reach. Will continue to monitor.         Debora Velasco RN  04/19/21 0960

## 2021-04-19 NOTE — PROGRESS NOTES
[facility]  Internal Medicine Teaching Residency Program  Inpatient Daily Progress Note  ______________________________________________________________________________    Patient: Ana María Noble  YOB: 1940   VTC:[id number]    Acct: [de-identified]     Room: 49PED/49PED  Admit date: 4/18/2021  Today's date: 04/19/21  Number of days in the hospital: 1    SUBJECTIVE   Admitting Diagnosis: Congestive heart failure (CHF) ([address]  CC: Generalized weakness, inability to go upstairs, exertional dyspnea  Pt examined at bedside. Chart & results reviewed. No acute events overnight. Stable respiratory status. No acute complaints reported. Blood pressure controlled. ROS:  Constitutional:  negative for chills, fevers, sweats  Respiratory:  negative for cough, dyspnea on exertion, hemoptysis, shortness of breath, wheezing  Cardiovascular:  negative for chest pain, chest pressure/discomfort, lower extremity edema, palpitations  Gastrointestinal:  negative for abdominal pain, constipation, diarrhea, nausea, vomiting  Neurological:  negative for dizziness, headache  BRIEF HISTORY     The patient is a pleasant 68 y.o. female with past history of hypertension, known right femoral head avascular necrosis and mild intermittent asthma who was brought to the hospital by EMS for generalized fatigue and worsening weakness. Patient lives with her daughter and apparently she went downstairs to the basement but then was unable to climb the stairs back up and remained in the basement for 2 days. Reported that family is taking good care of her but she continues to have difficulty with ambulation and some shortness of breath on exertion. She denies chest pain, nausea, vomiting, abdominal pain or any syncopal episode. Labs showed proBNP elevated to 1166, troponins 28> 25, UA with small leukocyte esterase, many bacteria. CXR with severe cardiomegaly.   X-ray right hip with pelvis with avascular necrosis of femoral heads which has been there from x-rays in the past that is causing difficulty in ambulation to the patient. OBJECTIVE     Vital Signs:  BP (!) 158/86   Pulse 133   Temp 97.5 °F (36.4 °C) (Oral)   Resp 14   Wt 250 lb (113.4 kg)   SpO2 95%   BMI 38.01 kg/m²     Temp (24hrs), Av.5 °F (36.4 °C), Min:97.5 °F (36.4 °C), Max:97.5 °F (36.4 °C)    In: -   Out: 400 [Urine:400]    Physical Exam:  Constitutional: This is a well developed, well nourished, 35-39.9 - Obesity Grade II 68y.o. year old female who is alert, oriented, cooperative and in no apparent distress. Head:normocephalic and atraumatic. EENT:  PERRLA. No conjunctival injections. Septum was midline, mucosa was without erythema, exudates or cobblestoning. No thrush was noted. Neck: Supple without thyromegaly. No elevated JVP. Trachea was midline. Respiratory: Chest was symmetrical without dullness to percussion. Breath sounds bilaterally were clear to auscultation. There were no wheezes, rhonchi or rales. There is no intercostal retraction or use of accessory muscles. No egophony noted. Cardiovascular: Regular without murmur, clicks, gallops or rubs. Abdomen: Slightly rounded and soft without organomegaly. No rebound, rigidity or guarding was appreciated. Lymphatic: No lymphadenopathy. Musculoskeletal: Normal curvature of the spine. No gross muscle weakness. Extremities:  No lower extremity edema, ulcerations, tenderness, varicosities or erythema. Muscle size, tone and strength are normal.  No involuntary movements are noted. Skin:  Warm and dry. Good color, turgor and pigmentation. No lesions or scars.   No cyanosis or clubbing  Neurological/Psychiatric: The patient's general behavior, level of consciousness, thought content and emotional status is normal.        Medications:  Scheduled Medications:    amLODIPine  5 mg Oral Daily    aspirin  81 mg Oral Daily    sodium chloride flush  10 mL Intravenous 2 times per day    metoprolol succinate  25 mg Oral BID    heparin (porcine)  5,000 Units Subcutaneous 3 times per day    melatonin  3 mg Oral Once     Continuous Infusions:    sodium chloride       PRN Medicationssodium chloride flush, 10 mL, PRN  sodium chloride, 25 mL, PRN  promethazine, 12.5 mg, Q6H PRN    Or  ondansetron, 4 mg, Q6H PRN  polyethylene glycol, 17 g, Daily PRN  acetaminophen, 650 mg, Q6H PRN    Or  acetaminophen, 650 mg, Q6H PRN  ipratropium-albuterol, 1 ampule, Q4H PRN        Diagnostic Labs:  CBC:   Recent Labs     04/18/21  1517 04/19/21  0544   WBC 4.4 3.7   RBC 4.02 3.81*   HGB 12.4 11.8*   HCT 39.2 37.0   MCV 97.5 97.1   RDW 13.1 13.3   PLT See Reflexed IPF Result See Reflexed IPF Result     BMP:   Recent Labs     04/18/21  1517 04/19/21  0544    139   K 3.8 3.6*    107   CO2 22 26   PHOS 2.0*  --    BUN 14 12   CREATININE 0.65 0.55     BNP: No results for input(s): BNP in the last 72 hours. PT/INR: No results for input(s): PROTIME, INR in the last 72 hours. APTT: No results for input(s): APTT in the last 72 hours. CARDIAC ENZYMES: No results for input(s): CKMB, CKMBINDEX, TROPONINI in the last 72 hours. Invalid input(s): CKTOTAL;3  FASTING LIPID PANEL:No results found for: CHOL, HDL, TRIG  LIVER PROFILE:   Recent Labs     04/18/21  1517   AST 28   ALT 15   BILITOT 0.72   ALKPHOS 78      MICROBIOLOGY:   Lab Results   Component Value Date/Time    CULTURE NO SIGNIFICANT GROWTH 02/18/2012 03:59 PM    CULTURE  02/18/2012 03:59 PM     [employer] [address] [facility] 3 [phone]       Imaging:    Xr Chest Portable    Result Date: 4/18/2021  Severe cardiomegaly. No acute disease. Xr Hip 2-3 Vw W Pelvis Right    Result Date: 4/18/2021  No acute osseous or soft tissue abnormality. Changes of avascular necrosis involving the femoral heads. Severe degenerative changes of the SI joints and hip joints.        ASSESSMENT & PLAN     Principal Problem:    Congestive heart failure (CHF) (HCC)  Active Problems:    HTN (hypertension)    Avascular necrosis of right femoral head (HCC)    Dyspnea on exertion  Resolved Problems:    * No resolved hospital problems. *      ASSESSMENT / PLAN:      1. Congestive heart failure: Stable. Continue Lasix 20 mg daily. 2. Avascular necrosis of femoral heads leading to difficulty in ambulation. Consult placed for orthopedic evaluation. Resident with orthopedic surgery stated that they will follow patient in the office, no intervention while she is inpatient. He asked to take off the consult. Patient to follow outpatient with orthopedic surgery for possible arthroplasty. 3. Essential hypertension: On hydralazine and Toprol at home. We will continue Toprol, add amlodipine. DC hydralazine.     DVT ppx: Heparin  GI ppx:      PT/OT/SW: to work with PT for strength building  Discharge Planning: inpatient for now    Adan Montez MD  Internal Medicine Resident, PGY-1  [id number] [facility]  4/19/2021, 9:15 AM

## 2021-04-19 NOTE — ED NOTES
Report given to RN taking care of pt, no further questions with understanding of pt.       Tre Emanuel RN  04/19/21 6277

## 2021-04-19 NOTE — H&P
[address]     Department of Internal Medicine - Staff Internal Medicine Teaching Service          ADMISSION NOTE/HISTORY AND PHYSICAL EXAMINATION   Date: 4/18/2021  Patient Name: Alba Kate  Date of admission: 4/18/2021  2:49 PM  YOB: 1940  PCP: PEBBLES Rodriguez CNP  History Obtained From:  patient    CHIEF COMPLAINT     Chief complaint: Generalized weakness, inability to go upstairs, exertional dyspnea    HISTORY OF PRESENTING ILLNESS     The patient is a pleasant 68 y.o. female with past history of hypertension, known right femoral head avascular necrosis and mild intermittent asthma who was brought to the hospital by EMS for generalized fatigue and worsening weakness. Patient lives with her daughter and apparently she went downstairs to the basement but then was unable to climb the stairs back up and remained in the basement for 2 days. Reported that family is taking good care of her but she continues to have difficulty with ambulation and some shortness of breath on exertion. She denies chest pain, nausea, vomiting, abdominal pain or any syncopal episode. Labs showed proBNP elevated to 1166, troponins 28> 25, UA with small leukocyte esterase, many bacteria. CXR with severe cardiomegaly. X-ray right hip with pelvis with avascular necrosis of femoral heads which has been there from x-rays in the past that is causing difficulty in ambulation to the patient. Review of Systems   Constitutional: Positive for activity change and fatigue. Negative for fever. HENT: Negative for congestion. Respiratory: Positive for shortness of breath. Negative for cough. Cardiovascular: Negative for chest pain and palpitations. Gastrointestinal: Negative for abdominal pain, diarrhea and vomiting. Genitourinary: Negative for decreased urine volume and dysuria. Musculoskeletal: Negative for joint swelling.    Neurological: Negative for dizziness, tremors and syncope. Hematological: Does not bruise/bleed easily. Psychiatric/Behavioral: Positive for confusion and decreased concentration. PAST MEDICAL HISTORY     Past Medical History:   Diagnosis Date    Arthritis     Asthma     Hyperlipidemia     Hypertension     Pulmonary embolism ([address]        PAST SURGICAL HISTORY     Past Surgical History:   Procedure Laterality Date    COLONOSCOPY      COLONOSCOPY N/A 1/15/2019    COLONOSCOPY DIAGNOSTIC performed by Shawn Garcia MD at [facility]    COLONOSCOPY  1/16/2019    COLONOSCOPY POLYPECTOMY SNARE/COLD BIOPSY performed by Shawn Garcia MD at [address] [place] [address] Left     UPPER GASTROINTESTINAL ENDOSCOPY N/A 1/15/2019    EGD BIOPSY performed by Shawn Garcia MD at [facility]       ALLERGIES     Morphine and Sulfa antibiotics    MEDICATIONS PRIOR TO ADMISSION     Prior to Admission medications    Medication Sig Start Date End Date Taking? Authorizing Provider   hydrALAZINE (APRESOLINE) 50 MG tablet Take 1 tablet by mouth 2 times daily 1/17/19   Tamica Gavin MD   ferrous sulfate 325 (65 Fe) MG EC tablet Take 1 tablet by mouth 2 times daily (with meals) 1/17/19   Tamica Gavin MD   albuterol sulfate  (90 Base) MCG/ACT inhaler Inhale 2 puffs into the lungs every 4 hours as needed for Wheezing 1/17/19   Elon Nageotte, MD   metoprolol succinate (TOPROL XL) 25 MG extended release tablet Take 25 mg by mouth 2 times daily    Historical Provider, MD   potassium chloride (MICRO-K) 10 MEQ extended release capsule Take 20 mEq by mouth 2 times daily     Historical Provider, MD   montelukast (SINGULAIR) 10 MG tablet Take 5 mg by mouth nightly     Historical Provider, MD   aspirin 81 MG EC tablet Take 81 mg by mouth daily    Historical Provider, MD       SOCIAL HISTORY     Tobacco: no  Alcohol: no  Illicits: no    FAMILY HISTORY     History reviewed. No pertinent family history.     PHYSICAL EXAM     Vitals: BP (!) 129/96   Pulse 133 Temp 97.5 °F (36.4 °C) (Oral)   Resp 14   Wt 250 lb (113.4 kg)   SpO2 100%   BMI 38.01 kg/m²   Tmax: Temp (24hrs), Av.5 °F (36.4 °C), Min:97.5 °F (36.4 °C), Max:97.5 °F (36.4 °C)    Last Body weight:   Wt Readings from Last 3 Encounters:   21 250 lb (113.4 kg)   19 246 lb (111.6 kg)     Body Mass Index : Body mass index is 38.01 kg/m². PHYSICAL EXAMINATION:  Constitutional: This is a well developed, well nourished, 35-39.9 - Obesity Grade II 68y.o. year old female who is alert, oriented, cooperative and in no apparent distress. Head:normocephalic and atraumatic. EENT:  PERRLA. No conjunctival injections. Septum was midline, mucosa was without erythema, exudates or cobblestoning. No thrush was noted. Neck: Supple without thyromegaly. No elevated JVP. Trachea was midline. Respiratory: Chest was symmetrical without dullness to percussion. Normal vesicular breathing bilaterally with bibasal fine crackles. No wheezing. No use of accessory muscles of respiration. Cardiovascular: Regular without murmur, clicks, gallops or rubs. Abdomen: Slightly rounded and soft without organomegaly. No rebound, rigidity or guarding was appreciated. Lymphatic: No lymphadenopathy. Musculoskeletal: Normal curvature of the spine. No gross muscle weakness. Extremities:  No lower extremity edema, ulcerations, tenderness, varicosities or erythema. Muscle size, tone and strength are normal.  No involuntary movements are noted. Skin:  Warm and dry. Good color, turgor and pigmentation. No lesions or scars.   No cyanosis or clubbing  Neurological/Psychiatric: The patient's general behavior, level of consciousness, thought content and emotional status is normal.          INVESTIGATIONS     Laboratory Testing:     Recent Results (from the past 24 hour(s))   CK    Collection Time: 21  3:17 PM   Result Value Ref Range    Total  26 - 192 U/L   MYOGLOBIN, SERUM    Collection Time: 21 3:17 PM   Result Value Ref Range    Myoglobin 75 (H) 25 - 58 ng/mL   CBC Auto Differential    Collection Time: 04/18/21  3:17 PM   Result Value Ref Range    WBC 4.4 3.5 - 11.3 k/uL    RBC 4.02 3.95 - 5.11 m/uL    Hemoglobin 12.4 11.9 - 15.1 g/dL    Hematocrit 39.2 36.3 - 47.1 %    MCV 97.5 82.6 - 102.9 fL    MCH 30.8 25.2 - 33.5 pg    MCHC 31.6 28.4 - 34.8 g/dL    RDW 13.1 11.8 - 14.4 %    Platelets See Reflexed IPF Result 138 - 453 k/uL    MPV NOT REPORTED 8.1 - 13.5 fL    NRBC Automated 0.0 0.0 per 100 WBC    Differential Type NOT REPORTED     WBC Morphology NOT REPORTED     RBC Morphology NOT REPORTED     Platelet Estimate NOT REPORTED     Seg Neutrophils 62 36 - 65 %    Lymphocytes 25 24 - 43 %    Monocytes 8 3 - 12 %    Eosinophils % 4 1 - 4 %    Basophils 1 0 - 2 %    Immature Granulocytes 0 0 %    Segs Absolute 2.75 1.50 - 8.10 k/uL    Absolute Lymph # 1.08 (L) 1.10 - 3.70 k/uL    Absolute Mono # 0.36 0.10 - 1.20 k/uL    Absolute Eos # 0.19 0.00 - 0.44 k/uL    Basophils Absolute <0.03 0.00 - 0.20 k/uL    Absolute Immature Granulocyte <0.03 0.00 - 0.30 k/uL   Comprehensive Metabolic Panel    Collection Time: 04/18/21  3:17 PM   Result Value Ref Range    Glucose 117 (H) 70 - 99 mg/dL    BUN 14 8 - 23 mg/dL    CREATININE 0.65 0.50 - 0.90 mg/dL    Bun/Cre Ratio NOT REPORTED 9 - 20    Calcium 10.4 8.6 - 10.4 mg/dL    Sodium 141 135 - 144 mmol/L    Potassium 3.8 3.7 - 5.3 mmol/L    Chloride 107 98 - 107 mmol/L    CO2 22 20 - 31 mmol/L    Anion Gap 12 9 - 17 mmol/L    Alkaline Phosphatase 78 35 - 104 U/L    ALT 15 5 - 33 U/L    AST 28 <32 U/L    Total Bilirubin 0.72 0.3 - 1.2 mg/dL    Total Protein 6.3 (L) 6.4 - 8.3 g/dL    Albumin 3.4 (L) 3.5 - 5.2 g/dL    Albumin/Globulin Ratio 1.2 1.0 - 2.5    GFR Non-African American >60 >60 mL/min    GFR African American >60 >60 mL/min    GFR Comment          GFR Staging NOT REPORTED    T4, FREE    Collection Time: 04/18/21  3:17 PM   Result Value Ref Range    Thyroxine, Free 1. 16 0.93 - 1.70 ng/dL   Troponin    Collection Time: 04/18/21  3:17 PM   Result Value Ref Range    Troponin, High Sensitivity 28 (H) 0 - 14 ng/L    Troponin T NOT REPORTED <0.03 ng/mL    Troponin Interp NOT REPORTED    Brain Natriuretic Peptide    Collection Time: 04/18/21  3:17 PM   Result Value Ref Range    Pro-BNP 1,166 (H) <300 pg/mL    BNP Interpretation Pro-BNP Reference Range:    Magnesium    Collection Time: 04/18/21  3:17 PM   Result Value Ref Range    Magnesium 2.2 1.6 - 2.6 mg/dL   Phosphorus    Collection Time: 04/18/21  3:17 PM   Result Value Ref Range    Phosphorus 2.0 (L) 2.6 - 4.5 mg/dL   Immature Platelet Fraction    Collection Time: 04/18/21  3:17 PM   Result Value Ref Range    Platelet, Immature Fraction 4.3 1.1 - 10.3 %    Platelet, Fluorescence 148 138 - 453 k/uL   COVID-19, Rapid    Collection Time: 04/18/21  3:21 PM    Specimen: Nasopharyngeal Swab   Result Value Ref Range    Specimen Description . NASOPHARYNGEAL SWAB     SARS-CoV-2, Rapid Not Detected Not Detected   Troponin    Collection Time: 04/18/21  5:28 PM   Result Value Ref Range    Troponin, High Sensitivity 25 (H) 0 - 14 ng/L    Troponin T NOT REPORTED <0.03 ng/mL    Troponin Interp NOT REPORTED    Urinalysis Reflex to Culture    Collection Time: 04/18/21  8:12 PM    Specimen: Urine, clean catch   Result Value Ref Range    Color, UA YELLOW YELLOW    Turbidity UA CLOUDY (A) CLEAR    Glucose, Ur NEGATIVE NEGATIVE    Bilirubin Urine NEGATIVE NEGATIVE    Ketones, Urine NEGATIVE NEGATIVE    Specific Gravity, UA 1.014 1.005 - 1.030    Urine Hgb NEGATIVE NEGATIVE    pH, UA 7.0 5.0 - 8.0    Protein, UA NEGATIVE NEGATIVE    Urobilinogen, Urine Normal Normal    Nitrite, Urine NEGATIVE NEGATIVE    Leukocyte Esterase, Urine SMALL (A) NEGATIVE    Urinalysis Comments NOT REPORTED    Microscopic Urinalysis    Collection Time: 04/18/21  8:12 PM   Result Value Ref Range    -          WBC, UA 5 TO 10 0 - 5 /HPF    RBC, UA 0 TO 2 0 - 2 /HPF    Casts UA NOT REPORTED 0 - 2 /LPF    Crystals, UA NOT REPORTED None /HPF    Epithelial Cells UA 0 TO 2 0 - 5 /HPF    Renal Epithelial, UA NOT REPORTED 0 /HPF    Bacteria, UA MANY (A) None    Mucus, UA 1+ (A) None    Trichomonas, UA NOT REPORTED None    Amorphous, UA 1+ (A) None    Other Observations UA NOT REPORTED NOT REQ. Yeast, UA NOT REPORTED None       Imaging:   Xr Chest Portable    Result Date: 4/18/2021  Severe cardiomegaly. No acute disease. Xr Hip 2-3 Vw W Pelvis Right    Result Date: 4/18/2021  No acute osseous or soft tissue abnormality. Changes of avascular necrosis involving the femoral heads. Severe degenerative changes of the SI joints and hip joints. ASSESSMENT & PLAN     Principal Problem:    Congestive heart failure (CHF) (HCC)  Active Problems:    HTN (hypertension)    Avascular necrosis of right femoral head (HCC)    Dyspnea on exertion  Resolved Problems:    * No resolved hospital problems. *      ASSESSMENT / PLAN:     1. Congestive heart failure: Elevated proBNP with dyspnea on exertion. Will give a dose of Lasix 20 mg now and diurese accordingly. 2. Avascular necrosis of femoral heads leading to difficulty in ambulation. Has been evaluated by the orthopedic in the past and they wanted to do outpatient follow-up. Patient never followed with orthopedic surgery outpatient. Orthopedic evaluation  3. Essential hypertension: On hydralazine and Toprol at home. Will resume medications according to blood pressure. DVT ppx: Heparin  GI ppx:     PT/OT/SW: to work with PT for strength building  Discharge Planning: inpatient for now    Raymond Busby MD  Internal Medicine Resident, PGY-1  [facility]; [place], [place]  4/18/2021, 10:42 PM   Attending Physician Statement  I have discussed the care of [facility], including pertinent history and exam findings,  with the resident.  I have seen and examined the patient and the key elements of all parts of the encounter have been performed by me. I agree with the assessment, plan and orders as documented by the resident with additions . Treatment plan Discussed with nursing staff in detail , all questions answered . Electronically signed by Daniella Oates MD on   4/19/21 at 1:14 PM EDT    Please note that this chart was generated using voice recognition Dragon dictation software. Although every effort was made to ensure the accuracy of this automated transcription, some errors in transcription may have occurred.

## 2021-04-19 NOTE — PROGRESS NOTES
Occupational Therapy   Occupational Therapy Initial Assessment  Date: 2021   Patient Name: nAat Jay  MRN: [id number]     : 1940     Copied from chart:  Anat Jay is a 68 y.o. female who presents with generalized fatigue and worsening weakness. Patient reportedly lives in the basement with her family members who live on the first floor. She states she was able to ambulate down the stairs however has been unable to walk back up the stairs for the past 3 days. She states she has been unable to get up off of the couch today. Denying any complaints currently and fatigue is ongoing when trying to ambulate any distance. Denies any chest pain, syncope, nausea, vomiting, cough, fever. Date of Service: 2021    Discharge Recommendations:  Patient would benefit from continued therapy after discharge  OT Equipment Recommendations  Equipment Needed: Yes  Mobility Devices: Sherol Isaac; ADL Assistive Devices; Wheelchair  Sherol Isaac: Rollator (4 Wheeled)  Wheelchair: Standard; Wheelchair Cushion Pressure Relieving  ADL Assistive Devices: Long-handled Sponge;Long-handled Shoe Horn;Sock-Aid Hard;Transfer Tub Bench;Hand-held Shower;Grab Bars - toilet;Grab Bars - shower    Assessment   Performance deficits / Impairments: Decreased functional mobility ; Decreased endurance;Decreased ADL status; Decreased sensation;Decreased posture;Decreased balance;Decreased ROM; Decreased strength;Decreased vision/visual deficit; Decreased safe awareness;Decreased high-level IADLs;Decreased cognition  Assessment: Pt awake, alert, and agreeable to OT evaluation this date. Pts bed mobility is max A for BLE and trunk progression from flat bed surface. Pts ADL's are currently mod-max A for all dressing and bathing tasks. Pt is limited by decreased strength, decreased activity tolerance, and decreased ability to complete functional transfers and functional mobility.  Pt would benefit from skilled occupational therapy services to address safe functional transfers, safe functional mobility, safe self-care, and safety awareness to increase her safety and indpendence with ADL and IADL tasks upon discharge. Prognosis: Fair  Decision Making: High Complexity  Patient Education: OT role, OT POC, OT goals, breathing techniques, general safety. Pt was receptive to education and verbalized understanding of all training completed. REQUIRES OT FOLLOW UP: Yes  Activity Tolerance  Activity Tolerance: Patient Tolerated treatment well;Patient limited by fatigue  Safety Devices  Safety Devices in place: Yes  Type of devices: Left in bed;Bed alarm in place;Nurse notified;Call light within reach;Gait belt  Restraints  Initially in place: No           Patient Diagnosis(es): The primary encounter diagnosis was Fatigue, unspecified type. A diagnosis of Avascular necrosis of right femoral head (HCC) was also pertinent to this visit. has a past medical history of Arthritis, Asthma, Congestive heart failure (CHF) ([facility] Hyperlipidemia, Hypertension, and Pulmonary embolism ([facility] has a past surgical history that includes Rotator cuff repair (Left); Colonoscopy; Colonoscopy (N/A, 1/15/2019); Upper gastrointestinal endoscopy (N/A, 1/15/2019); and Colonoscopy (1/16/2019). Restrictions  Restrictions/Precautions  Restrictions/Precautions: Fall Risk(Up with assistance)  Required Braces or Orthoses?: No    Subjective   General  Patient assessed for rehabilitation services?: Yes  Family / Caregiver Present: No  Diagnosis: Avascular necrosis of right femoral head  General Comment  Comments: Pts primary RN OK'd pt to be seen by OT prior to entry into pts room. Pt was awake, alert, and agreeable to OT evaluation this date.   Patient Currently in Pain: No  Vital Signs  Pulse: (!) 48  Heart Rate Source: Left;Monitor  Patient Currently in Pain: No     Social/Functional History  Social/Functional History  Lives With: Daughter  Type of Home: House  Home Layout: Two level(Noted safety concerns. ADL  Feeding: Stand by assistance;Setup; Increased time to complete  Grooming: Stand by assistance;Setup; Increased time to complete  UE Bathing: Minimal assistance;Setup; Increased time to complete  LE Bathing: Maximum assistance;Setup; Increased time to complete  UE Dressing: Moderate assistance;Setup; Increased time to complete  LE Dressing: Maximum assistance;Setup; Increased time to complete(Pt requires A for donning B socks from supine position in bed. Pt noting requiring assistance for LB dressing prior to this hopitalization.)  Toileting: Maximum assistance;Setup; Increased time to complete  Tone RUE  RUE Tone: Normotonic  Tone LUE  LUE Tone: Normotonic  Coordination  Movements Are Fluid And Coordinated: No  Coordination and Movement description: Gross motor impairments; Fine motor impairments  Quality of Movement Other  Comment: Pt demo's mildly impaired GM and FM coordination     Bed mobility  Supine to Sit: Maximum assistance(Requires assistance for BLE and trunk progression)  Sit to Supine: Maximum assistance(Requires assistance for BLE and trunk progression)  Scooting: Stand by assistance(SBA and extra time for safety)  Transfers  Sit to stand: Maximum assistance;2 Person assistance(to achieve clearance from mattress surface)  Stand to sit: Maximum assistance;2 Person assistance(To control descent onto mattress)     Cognition  Overall Cognitive Status: Exceptions  Arousal/Alertness: Delayed responses to stimuli  Following Commands: Follows one step commands consistently; Follows one step commands with increased time  Attention Span: Attends with cues to redirect  Safety Judgement: Decreased awareness of need for assistance  Problem Solving: Assistance required to generate solutions;Assistance required to correct errors made;Assistance required to identify errors made  Insights: Decreased awareness of deficits  Initiation: Requires cues for some  Sequencing: Requires cues for some Sensation  Overall Sensation Status: Impaired(Pt noting occasional numbness and tingling in L thumb and rarely in L fingers.)        LUE PROM (degrees)  LUE PROM: Exceptions  LUE General PROM: Pts shoulder flexion 0-80. Pt noted limitations in LUE ROM after rotator cuff surgery in 1995. LUE AROM (degrees)  LUE AROM : Exceptions  LUE General AROM: Pts shoulder flexion ~ 0-70. Pt noted limitations in LUE ROM after rotator cuff surgery in 1995. Left Hand AROM (degrees)  Left Hand AROM: WFL  RUE AROM (degrees)  RUE AROM : WFL  Right Hand AROM (degrees)  Right Hand AROM: WFL  LUE Strength  Gross LUE Strength: Exceptions to [facility]  L Hand General: 4-/5  LUE Strength Comment: Pts L shoulder unable to fully achieve testing position indicating 2-/5. Pts L elbow, wrist and hand grossly 4-/5. RUE Strength  Gross RUE Strength: WFL  R Hand General: 4-/5  RUE Strength Comment: Grossly 4-/5    Plan   Plan  Times per week: 3-4 x/wk  Current Treatment Recommendations: Patient/Caregiver Education & Training, ROM, Equipment Evaluation, Education, & procurement, Balance Training, Functional Mobility Training, Endurance Training, Pain Management, Self-Care / ADL, Safety Education & Training     AM-PAC Score        -[facility] Inpatient Daily Activity Raw Score: 14 (04/19/21 1550)  AM-PAC Inpatient ADL T-Scale Score : 33.39 (04/19/21 1550)  ADL Inpatient CMS 0-100% Score: 59.67 (04/19/21 1550)  ADL Inpatient CMS G-Code Modifier : CK (04/19/21 1550)    Goals  Short term goals  Time Frame for Short term goals: By discharge, pt will:  Short term goal 1: demo functional transfers mod A with use of LRD to improve independence with ADLs  Short term goal 2: demo functional mobility mod A with use of LRD to improve safety with home mobility  Short term goal 3: demo sitting balance x 10 minutes with good posture to improve participation in seated ADL tasks.   Short term goal 4: demo UB ADLs and grooming tasks mod I with use of energy conservation strategies  Short term goal 5: demo LB ADLs and toileting tasks mod A with use of AE PRN and appropriate DME  Short term goal 6: demo bed mobility mod A for improved independence with ADLs       Therapy Time   Individual Concurrent Group Co-treatment   Time In 1327         Time Out 1417         Minutes 50         Timed Code Treatment Minutes: 127 Daniel Reyes, S/OT

## 2021-04-19 NOTE — ED NOTES
Pillow provided. Daughter at bedside. Will continue to monitor.        Hardy Xavier RN  04/18/21 [id number]

## 2021-04-19 NOTE — CARE COORDINATION
Met with pt to discuss transitional planning. She is unsure of plan at d/c. She needs to discuss with her daughters. Call to [facility]. No answer and unable to leave message. Spoke to Blair. Her and [facility] plan to visit pt today to discuss plan. They are agreeable with whatever pt wants. She is going to talk to [facility] to update her    5 met with pt to discuss transitional planning. She would like to go home with home care. She is requesting wheelchair, bedside commode, and rollator. Spoke to [facility]. She is agreeable with plan. Pt will need transportation home    1390 insurance will only cover one \"walking item\" for DME. Spoke to pt. She would like wheelchair and not rollator. Orders faxed to [facility]. [facility] notified to call company to have equipment delivered. Tried to schedule follow up appt with PCP. Pt has not been seen since last year. They are reviewing to determine if they are able to still follow    1637 notified by [facility] that the bedside commode is on backorder. Orders faxed to [address] [place] [address]. [facility] updated.  AVS faxed to PCP    Discharge Report    [facility] [facility] Case Management Department  Written by: Salo Lugo RN    Patient Name: Ry Johnson  Attending Provider: Patt Blanca MD  Admit Date: 2021  2:49 PM  MRN: [id number]  Account: [de-identified]                     : 1940  Discharge Date: 2021      Disposition: home with daughter and Med 1    Salo Lugo RN
and bedroom would like and needs WHEEL CHAIR ,ROLLATOR, HOSP BED, SHOWER CHAIR.            Electronically signed by Maria Esther Linda RN on 4/19/21 at 9:17 AM EDT

## 2021-04-19 NOTE — ED NOTES
Bed: 49PED  Expected date:   Expected time:   Means of arrival:   Comments:  [address] Avni Diamond RN  04/19/21 9764

## 2021-04-19 NOTE — PROGRESS NOTES
Mert Singleton was evaluated today and a DME order was entered for a standard wheelchair because she requires this to successfully complete daily living tasks of bathing, toileting, personal cares, ambulating and taking own medications. A standard manual wheelchair is necessary due to patient's impaired ambulation and mobility restrictions and would be unable to resolve these daily living tasks using a cane or walker. The patient is capable of using a standard wheelchair safely in their home and can maneuver within their home with adequate access. There is a caregiver available to provide necessary assistance. The need for this equipment was discussed with the patient and she understands, is in agreement, and has not expressed an unwillingness to use the wheelchair. Mert Singleton was evaluated today and a DME order was entered for a wheeled walker with seat because she requires this to successfully complete daily living tasks of bathing, toileting, personal cares, ambulating and taking own medications. A wheeled walker with seat is necessary due to the patient's unsteady gait, upper body weakness, inability to  and ambulation device, ambulating only short distances by pushing a walker, and the need to sit for a short time before resuming ambulation. These tasks cannot be completed with a lesser ambulation device such as a cane, crutch, or standard walker. The need for this equipment was discussed with the patient and she understands and is in agreement. Mert Singleton requires a bedside commode due to being confined to one level of the home, and is physically incapable of utilizing regular toilet facilities. Current body weight is Weight: 195 lb 3.2 oz (88.5 kg).       Nima Chavez MD  PGY-3 Internal Medicine Resident  [address] [place] [address]  4/19/2021 3:25 PM

## 2021-04-22 NOTE — PROGRESS NOTES
Physician Progress Note      PATIENT:               Concetta Guthrie  CSN #:                  [id number]  :                       1940  ADMIT DATE:       2021 2:49 PM  [address] DATE:        2021 5:05 PM  RESPONDING  PROVIDER #:        Chucho Bartholomew MD          QUERY TEXT:    Pt admitted with dyspnea on exertion and progressive weakness  and has CHF   documented. Noted no history of CHF documented. not ordered on home diuretics   one time dose of po Lasix given. If possible, please document in progress   notes and discharge summary further specificity regarding the type and acuity   of CHF:    The medical record reflects the following:  Risk Factors: history of asthma, HTN, pulmonary embolism  Clinical Indicators: documentation of CHF BNP 1,166 CXR shows severe   cardiomegaly and per assessment noted bilateral pitting edema to lower   extremities, last echo done in 2019 showed EF 65-70% with grade 1 diastolic   dysfunction and hyperdynamic systolic function. po Lasix x 1 dose no diuretics   continued . per ED mild signs of fluid overload  Treatment: monitoring    thank you Ozzy Camarena RN BSN [facility]  [phone]  Options provided:  -- New onset Acute Diastolic CHF/HFpEF confirmed  -- dyspnea due to fluid overload CHF ruled out  -- dyspnea due to HTN heart disease without heart failure  -- Acute CHF ruled out dyspnea due to please specify cause  -- Other - I will add my own diagnosis  -- Disagree - Not applicable / Not valid  -- Disagree - Clinically unable to determine / Unknown  -- Refer to Clinical Documentation Reviewer    PROVIDER RESPONSE TEXT:    Chronic congestive heart failure    Query created by:  Priscilla Lyman on 2021 2:14 PM      Electronically signed by:  Chucho Bartholomew MD 2021 6:45 PM

## 2021-04-23 NOTE — DISCHARGE SUMMARY
[address]     Department of Internal Medicine - Staff Internal Medicine Teaching Service    INPATIENT DISCHARGE SUMMARY      Patient Identification:  Ezra Rodrigez is a 68 y.o. female. :  1940  MRN: [id number]     Acct: [de-identified]   PCP: PEBBLES Ross CNP  Admit Date:  2021  Discharge date and time: 2021  5:05 PM   Attending Provider: No att. providers found                                     [address] Problem Lists:  Principal Problem:    Avascular necrosis of right femoral head ([address]  Active Problems:    HTN (hypertension)    Dyspnea on exertion    Chronic diastolic congestive heart failure ([address]  Resolved Problems:    * No resolved hospital problems. *      HOSPITAL STAY     Brief Inpatient course:   Ezra Rodrigez is a 68 y.o. female with past history of hypertension, known right femoral head avascular necrosis and mild intermittent asthma who was brought to the hospital by EMS for generalized fatigue and worsening weakness. Patient lives with her daughter and apparently she went downstairs to the basement but then was unable to climb the stairs back up and remained in the basement for 2 days.  Reported that family is taking good care of her but she continues to have difficulty with ambulation and some shortness of breath on exertion. X-ray right hip with pelvis with avascular necrosis of femoral heads which has been there from x-rays in the past that is causing difficulty in ambulation to the patient. CXR with severe cardiomegaly. Patient received home dose of lasix for chronic diastolic heart failure. Orthopedic surgery was consulted for avascular necrosis of femoral head but they wanted to follow outpatient for possible arthroplasty. Patient was discharged home in stable condition with instructions to follow-up outpatient with orthopedic surgery.   Patient's hydralazine was stopped and was started on Norvasc 5 mg (SINGULAIR) 10 MG tablet Take 5 mg by mouth nightly Historical Med      aspirin 81 MG EC tablet Take 81 mg by mouth dailyHistorical Med         STOP taking these medications       hydrALAZINE (APRESOLINE) 50 MG tablet Comments:   Reason for Stopping:               Activity: activity as tolerated    Diet: regular diet    Follow-up:    [address]  [address]  [address] [facility] [address]  [phone]  In 1 week  As needed for foot care    Mehdi Joiner, APRN - CNP  [address] [facility] [address] [facility]  [phone]    In 1 week  post hospital follow up    Thong Collins, [address] [facility] [address], [address] [facility] [address] [facility]  [phone]    In 1 week  Avascular necrosis of right femoral head      Patient Instructions:   Monitor BP at home  Stop Hydralazine and start Norvasc 5 mg daily  Changed Toprol XL 25 mg to once daily  Continue other medications  Follow up with PCP in 1 week. Can get ECHO out-patient    Home care. PT/OT. Help with ADL  Follow up with Dr Akilah Arguelles, Orthopedics in 1 week for possible right hip total arthroplasty. Call to make appointment    Follow up with [facility] podiatry clinic upon discharge for routine foot care. Call for appointment  Follow up labs: None  Follow up imaging: none    Note that over 30 minutes was spent in preparing discharge papers, discussing discharge with patient, medication review, etc.      Gilford Moulder, MD  Internal Medicine Resident, PGY-1  [address], [place]  4/23/2021, 2:37 AM

## 2021-04-30 NOTE — TELEPHONE ENCOUNTER
Patient called to ask a few questions regarding services that Dr Chayito Parsons can provide to help her while at home. She states that she doesn't want to schedule a new patient appointment until she can discuss services and options available to her first.  Please advise.

## 2022-01-01 ENCOUNTER — APPOINTMENT (OUTPATIENT)
Dept: GENERAL RADIOLOGY | Age: 82
DRG: 283 | End: 2022-01-01
Payer: MEDICARE

## 2022-01-01 ENCOUNTER — HOSPITAL ENCOUNTER (INPATIENT)
Age: 82
LOS: 1 days | DRG: 283 | End: 2022-04-12
Attending: EMERGENCY MEDICINE
Payer: MEDICARE

## 2022-01-01 VITALS
TEMPERATURE: 83.5 F | SYSTOLIC BLOOD PRESSURE: 108 MMHG | RESPIRATION RATE: 18 BRPM | OXYGEN SATURATION: 90 % | DIASTOLIC BLOOD PRESSURE: 82 MMHG | HEIGHT: 68 IN | BODY MASS INDEX: 29.55 KG/M2 | HEART RATE: 55 BPM | WEIGHT: 195 LBS

## 2022-01-01 DIAGNOSIS — I46.9 CARDIAC ARREST (HCC): Primary | ICD-10-CM

## 2022-01-01 LAB
ABSOLUTE EOS #: 0 K/UL (ref 0–0.4)
ABSOLUTE IMMATURE GRANULOCYTE: 0 K/UL (ref 0–0.3)
ABSOLUTE LYMPH #: 1.01 K/UL (ref 1–4.8)
ABSOLUTE MONO #: 0.34 K/UL (ref 0.1–0.8)
ALBUMIN SERPL-MCNC: 2.3 G/DL (ref 3.5–5.2)
ALBUMIN/GLOBULIN RATIO: 1 (ref 1–2.5)
ALLEN TEST: ABNORMAL
ALP BLD-CCNC: 138 U/L (ref 35–104)
ALT SERPL-CCNC: 55 U/L (ref 5–33)
ANION GAP SERPL CALCULATED.3IONS-SCNC: 17 MMOL/L (ref 9–17)
ANION GAP SERPL CALCULATED.3IONS-SCNC: 20 MMOL/L (ref 9–17)
ANION GAP: 13 MMOL/L (ref 7–16)
ANION GAP: 16 MMOL/L (ref 7–16)
AST SERPL-CCNC: 97 U/L
BASOPHILS # BLD: 0 % (ref 0–2)
BASOPHILS ABSOLUTE: 0 K/UL (ref 0–0.2)
BILIRUB SERPL-MCNC: 5.1 MG/DL (ref 0.3–1.2)
BUN BLDV-MCNC: 67 MG/DL (ref 8–23)
BUN BLDV-MCNC: 71 MG/DL (ref 8–23)
CALCIUM SERPL-MCNC: 10.1 MG/DL (ref 8.6–10.4)
CALCIUM SERPL-MCNC: 10.4 MG/DL (ref 8.6–10.4)
CARBOXYHEMOGLOBIN: 1.7 % (ref 0–5)
CHLORIDE BLD-SCNC: 104 MMOL/L (ref 98–107)
CHLORIDE BLD-SCNC: 109 MMOL/L (ref 98–107)
CO2: 12 MMOL/L (ref 20–31)
CO2: 16 MMOL/L (ref 20–31)
CREAT SERPL-MCNC: 1.54 MG/DL (ref 0.5–0.9)
CREAT SERPL-MCNC: 1.65 MG/DL (ref 0.5–0.9)
EOSINOPHILS RELATIVE PERCENT: 0 % (ref 1–4)
FIO2: 100
FIO2: ABNORMAL
GFR AFRICAN AMERICAN: 36 ML/MIN
GFR AFRICAN AMERICAN: 39 ML/MIN
GFR NON-AFRICAN AMERICAN: 16 ML/MIN
GFR NON-AFRICAN AMERICAN: 17 ML/MIN
GFR NON-AFRICAN AMERICAN: 30 ML/MIN
GFR NON-AFRICAN AMERICAN: 32 ML/MIN
GFR SERPL CREATININE-BSD FRML MDRD: 19 ML/MIN
GFR SERPL CREATININE-BSD FRML MDRD: 21 ML/MIN
GFR SERPL CREATININE-BSD FRML MDRD: ABNORMAL ML/MIN/{1.73_M2}
GLUCOSE BLD-MCNC: 10 MG/DL (ref 65–105)
GLUCOSE BLD-MCNC: 118 MG/DL (ref 74–100)
GLUCOSE BLD-MCNC: 14 MG/DL (ref 65–105)
GLUCOSE BLD-MCNC: 270 MG/DL (ref 70–99)
GLUCOSE BLD-MCNC: 278 MG/DL (ref 74–100)
GLUCOSE BLD-MCNC: 473 MG/DL (ref 70–99)
GLUCOSE BLD-MCNC: 508 MG/DL (ref 74–100)
GLUCOSE BLD-MCNC: 55 MG/DL (ref 65–105)
HCO3 VENOUS: 15.8 MMOL/L (ref 22–29)
HCO3 VENOUS: 15.9 MMOL/L (ref 22–29)
HCO3 VENOUS: 16.3 MMOL/L (ref 24–30)
HCT VFR BLD CALC: 42.8 % (ref 36.3–47.1)
HEMOGLOBIN: 12.8 G/DL (ref 11.9–15.1)
IMMATURE GRANULOCYTES: 0 %
INR BLD: 4
LACTIC ACID, SEPSIS WHOLE BLOOD: 14.6 MMOL/L (ref 0.5–1.9)
LACTIC ACID, WHOLE BLOOD: 13.5 MMOL/L (ref 0.7–2.1)
LYMPHOCYTES # BLD: 9 % (ref 24–44)
MCH RBC QN AUTO: 33 PG (ref 25.2–33.5)
MCHC RBC AUTO-ENTMCNC: 29.9 G/DL (ref 28.4–34.8)
MCV RBC AUTO: 110.3 FL (ref 82.6–102.9)
MODE: ABNORMAL
MONOCYTES # BLD: 3 % (ref 1–7)
MORPHOLOGY: ABNORMAL
MYOGLOBIN: 4549 NG/ML (ref 25–58)
NEGATIVE BASE EXCESS, ART: 21 (ref 0–2)
NEGATIVE BASE EXCESS, VEN: 14 (ref 0–2)
NEGATIVE BASE EXCESS, VEN: 18 (ref 0–2)
NEGATIVE BASE EXCESS, VEN: 18.4 MMOL/L (ref 0–2)
NRBC AUTOMATED: 0.4 PER 100 WBC
O2 DEVICE/FLOW/%: ABNORMAL
O2 SAT, VEN: 36 % (ref 60–85)
O2 SAT, VEN: 50 % (ref 60–85)
O2 SAT, VEN: 61.9 % (ref 60–85)
PARTIAL THROMBOPLASTIN TIME: 68.6 SEC (ref 20.5–30.5)
PATIENT TEMP: 28.3
PATIENT TEMP: 37
PCO2, VEN: 52.1 MM HG (ref 41–51)
PCO2, VEN: 82.1 (ref 39–55)
PCO2, VEN: 93.5 MM HG (ref 41–51)
PDW BLD-RTO: 19.2 % (ref 11.8–14.4)
PH VENOUS: 6.84 (ref 7.32–7.43)
PH VENOUS: 6.93 (ref 7.32–7.42)
PH VENOUS: 7.09 (ref 7.32–7.43)
PLATELET # BLD: ABNORMAL K/UL (ref 138–453)
PLATELET, FLUORESCENCE: 21 K/UL (ref 138–453)
PLATELET, IMMATURE FRACTION: 13.3 % (ref 1.1–10.3)
PO2, VEN: 37 MM HG (ref 30–50)
PO2, VEN: 38.9 MM HG (ref 30–50)
PO2, VEN: 51.6 (ref 30–50)
POC BUN: 71 MG/DL (ref 8–26)
POC BUN: 79 MG/DL (ref 8–26)
POC CHLORIDE: 112 MMOL/L (ref 98–107)
POC CHLORIDE: 113 MMOL/L (ref 98–107)
POC CREATININE: 2.7 MG/DL (ref 0.51–1.19)
POC CREATININE: 2.84 MG/DL (ref 0.51–1.19)
POC HCO3: 11.5 MMOL/L (ref 21–28)
POC HEMATOCRIT: 29 % (ref 36–46)
POC HEMATOCRIT: 40 % (ref 36–46)
POC HEMOGLOBIN: 13.6 G/DL (ref 12–16)
POC HEMOGLOBIN: 9.8 G/DL (ref 12–16)
POC IONIZED CALCIUM: 1.36 MMOL/L (ref 1.15–1.33)
POC IONIZED CALCIUM: 1.37 MMOL/L (ref 1.15–1.33)
POC LACTIC ACID: 12.27 MMOL/L (ref 0.56–1.39)
POC LACTIC ACID: 13.16 MMOL/L (ref 0.56–1.39)
POC LACTIC ACID: 16.1 MMOL/L (ref 0.56–1.39)
POC O2 SATURATION: 90 % (ref 94–98)
POC PCO2 TEMP: 41 MM HG
POC PCO2: 60 MM HG (ref 35–48)
POC PH TEMP: 6.99
POC PH: 6.89 (ref 7.35–7.45)
POC PO2 TEMP: 59 MM HG
POC PO2: 99 MM HG (ref 83–108)
POC POTASSIUM: 6.2 MMOL/L (ref 3.5–5.1)
POC POTASSIUM: 7.8 MMOL/L (ref 3.5–5.1)
POC SODIUM: 143 MMOL/L (ref 138–146)
POC SODIUM: 145 MMOL/L (ref 138–146)
POC TCO2: 18 MMOL/L (ref 22–30)
POC TCO2: 19 MMOL/L (ref 22–30)
POTASSIUM SERPL-SCNC: 5.2 MMOL/L (ref 3.7–5.3)
POTASSIUM SERPL-SCNC: 6.9 MMOL/L (ref 3.7–5.3)
PRO-BNP: ABNORMAL PG/ML
PROTHROMBIN TIME: 38.4 SEC (ref 9.1–12.3)
RBC # BLD: 3.88 M/UL (ref 3.95–5.11)
SAMPLE SITE: ABNORMAL
SEG NEUTROPHILS: 88 % (ref 36–66)
SEGMENTED NEUTROPHILS ABSOLUTE COUNT: 9.85 K/UL (ref 1.8–7.7)
SODIUM BLD-SCNC: 137 MMOL/L (ref 135–144)
SODIUM BLD-SCNC: 141 MMOL/L (ref 135–144)
THYROXINE, FREE: 0.61 NG/DL (ref 0.93–1.7)
TOTAL CK: 619 U/L (ref 26–192)
TOTAL PROTEIN: 4.6 G/DL (ref 6.4–8.3)
TROPONIN, HIGH SENSITIVITY: 124 NG/L (ref 0–14)
TSH SERPL DL<=0.05 MIU/L-ACNC: 7.51 UIU/ML (ref 0.3–5)
WBC # BLD: 11.2 K/UL (ref 3.5–11.3)

## 2022-01-01 PROCEDURE — 85014 HEMATOCRIT: CPT

## 2022-01-01 PROCEDURE — 82550 ASSAY OF CK (CPK): CPT

## 2022-01-01 PROCEDURE — 51702 INSERT TEMP BLADDER CATH: CPT

## 2022-01-01 PROCEDURE — 5A1935Z RESPIRATORY VENTILATION, LESS THAN 24 CONSECUTIVE HOURS: ICD-10-PCS | Performed by: INTERNAL MEDICINE

## 2022-01-01 PROCEDURE — 6360000002 HC RX W HCPCS: Performed by: EMERGENCY MEDICINE

## 2022-01-01 PROCEDURE — 82330 ASSAY OF CALCIUM: CPT

## 2022-01-01 PROCEDURE — 84443 ASSAY THYROID STIM HORMONE: CPT

## 2022-01-01 PROCEDURE — 2580000003 HC RX 258: Performed by: EMERGENCY MEDICINE

## 2022-01-01 PROCEDURE — 82533 TOTAL CORTISOL: CPT

## 2022-01-01 PROCEDURE — 82565 ASSAY OF CREATININE: CPT

## 2022-01-01 PROCEDURE — 84484 ASSAY OF TROPONIN QUANT: CPT

## 2022-01-01 PROCEDURE — 96375 TX/PRO/DX INJ NEW DRUG ADDON: CPT

## 2022-01-01 PROCEDURE — 2500000003 HC RX 250 WO HCPCS

## 2022-01-01 PROCEDURE — 71045 X-RAY EXAM CHEST 1 VIEW: CPT

## 2022-01-01 PROCEDURE — 84520 ASSAY OF UREA NITROGEN: CPT

## 2022-01-01 PROCEDURE — 82805 BLOOD GASES W/O2 SATURATION: CPT

## 2022-01-01 PROCEDURE — 83874 ASSAY OF MYOGLOBIN: CPT

## 2022-01-01 PROCEDURE — 82803 BLOOD GASES ANY COMBINATION: CPT

## 2022-01-01 PROCEDURE — 6370000000 HC RX 637 (ALT 250 FOR IP): Performed by: EMERGENCY MEDICINE

## 2022-01-01 PROCEDURE — 85025 COMPLETE CBC W/AUTO DIFF WBC: CPT

## 2022-01-01 PROCEDURE — 83880 ASSAY OF NATRIURETIC PEPTIDE: CPT

## 2022-01-01 PROCEDURE — 94002 VENT MGMT INPAT INIT DAY: CPT

## 2022-01-01 PROCEDURE — 85055 RETICULATED PLATELET ASSAY: CPT

## 2022-01-01 PROCEDURE — 2500000003 HC RX 250 WO HCPCS: Performed by: EMERGENCY MEDICINE

## 2022-01-01 PROCEDURE — 99284 EMERGENCY DEPT VISIT MOD MDM: CPT

## 2022-01-01 PROCEDURE — 92950 HEART/LUNG RESUSCITATION CPR: CPT

## 2022-01-01 PROCEDURE — 6360000002 HC RX W HCPCS

## 2022-01-01 PROCEDURE — 85610 PROTHROMBIN TIME: CPT

## 2022-01-01 PROCEDURE — 83605 ASSAY OF LACTIC ACID: CPT

## 2022-01-01 PROCEDURE — 36415 COLL VENOUS BLD VENIPUNCTURE: CPT

## 2022-01-01 PROCEDURE — 1200000000 HC SEMI PRIVATE

## 2022-01-01 PROCEDURE — 82947 ASSAY GLUCOSE BLOOD QUANT: CPT

## 2022-01-01 PROCEDURE — 0BH18EZ INSERTION OF ENDOTRACHEAL AIRWAY INTO TRACHEA, VIA NATURAL OR ARTIFICIAL OPENING ENDOSCOPIC: ICD-10-PCS | Performed by: INTERNAL MEDICINE

## 2022-01-01 PROCEDURE — 80053 COMPREHEN METABOLIC PANEL: CPT

## 2022-01-01 PROCEDURE — 06HY33Z INSERTION OF INFUSION DEVICE INTO LOWER VEIN, PERCUTANEOUS APPROACH: ICD-10-PCS | Performed by: STUDENT IN AN ORGANIZED HEALTH CARE EDUCATION/TRAINING PROGRAM

## 2022-01-01 PROCEDURE — 80048 BASIC METABOLIC PNL TOTAL CA: CPT

## 2022-01-01 PROCEDURE — 2500000003 HC RX 250 WO HCPCS: Performed by: STUDENT IN AN ORGANIZED HEALTH CARE EDUCATION/TRAINING PROGRAM

## 2022-01-01 PROCEDURE — 93005 ELECTROCARDIOGRAM TRACING: CPT | Performed by: EMERGENCY MEDICINE

## 2022-01-01 PROCEDURE — 85730 THROMBOPLASTIN TIME PARTIAL: CPT

## 2022-01-01 PROCEDURE — 96374 THER/PROPH/DIAG INJ IV PUSH: CPT

## 2022-01-01 PROCEDURE — 80051 ELECTROLYTE PANEL: CPT

## 2022-01-01 PROCEDURE — 84439 ASSAY OF FREE THYROXINE: CPT

## 2022-01-01 PROCEDURE — 2580000003 HC RX 258

## 2022-01-01 PROCEDURE — 2580000003 HC RX 258: Performed by: STUDENT IN AN ORGANIZED HEALTH CARE EDUCATION/TRAINING PROGRAM

## 2022-01-01 RX ORDER — ONDANSETRON 4 MG/1
4 TABLET, ORALLY DISINTEGRATING ORAL EVERY 8 HOURS PRN
Status: DISCONTINUED | OUTPATIENT
Start: 2022-01-01 | End: 2022-04-12 | Stop reason: HOSPADM

## 2022-01-01 RX ORDER — DEXTROSE MONOHYDRATE 25 G/50ML
25 INJECTION, SOLUTION INTRAVENOUS ONCE
Status: DISCONTINUED | OUTPATIENT
Start: 2022-01-01 | End: 2022-04-12 | Stop reason: HOSPADM

## 2022-01-01 RX ORDER — CALCIUM GLUCONATE 20 MG/ML
1000 INJECTION, SOLUTION INTRAVENOUS ONCE
Status: COMPLETED | OUTPATIENT
Start: 2022-01-01 | End: 2022-01-01

## 2022-01-01 RX ORDER — POLYETHYLENE GLYCOL 3350 17 G/17G
17 POWDER, FOR SOLUTION ORAL DAILY PRN
Status: DISCONTINUED | OUTPATIENT
Start: 2022-01-01 | End: 2022-04-12 | Stop reason: HOSPADM

## 2022-01-01 RX ORDER — ACETAMINOPHEN 325 MG/1
650 TABLET ORAL EVERY 6 HOURS PRN
Status: DISCONTINUED | OUTPATIENT
Start: 2022-01-01 | End: 2022-04-12 | Stop reason: HOSPADM

## 2022-01-01 RX ORDER — ACETAMINOPHEN 650 MG/1
650 SUPPOSITORY RECTAL EVERY 6 HOURS PRN
Status: DISCONTINUED | OUTPATIENT
Start: 2022-01-01 | End: 2022-04-12 | Stop reason: HOSPADM

## 2022-01-01 RX ORDER — SODIUM CHLORIDE 0.9 % (FLUSH) 0.9 %
5-40 SYRINGE (ML) INJECTION EVERY 12 HOURS SCHEDULED
Status: DISCONTINUED | OUTPATIENT
Start: 2022-01-01 | End: 2022-04-12 | Stop reason: HOSPADM

## 2022-01-01 RX ORDER — SODIUM CHLORIDE 0.9 % (FLUSH) 0.9 %
5-40 SYRINGE (ML) INJECTION PRN
Status: DISCONTINUED | OUTPATIENT
Start: 2022-01-01 | End: 2022-04-12 | Stop reason: HOSPADM

## 2022-01-01 RX ORDER — EPINEPHRINE 0.1 MG/ML
SYRINGE (ML) INJECTION
Status: DISCONTINUED
Start: 2022-01-01 | End: 2022-04-12 | Stop reason: HOSPADM

## 2022-01-01 RX ORDER — HEPARIN SODIUM 1000 [USP'U]/ML
1400 INJECTION, SOLUTION INTRAVENOUS; SUBCUTANEOUS ONCE
Status: DISCONTINUED | OUTPATIENT
Start: 2022-01-01 | End: 2022-04-12 | Stop reason: HOSPADM

## 2022-01-01 RX ORDER — DEXTROSE MONOHYDRATE 100 MG/ML
INJECTION, SOLUTION INTRAVENOUS CONTINUOUS
Status: DISCONTINUED | OUTPATIENT
Start: 2022-01-01 | End: 2022-04-12 | Stop reason: HOSPADM

## 2022-01-01 RX ORDER — NOREPINEPHRINE BIT/0.9 % NACL 16MG/250ML
1-100 INFUSION BOTTLE (ML) INTRAVENOUS CONTINUOUS
Status: DISCONTINUED | OUTPATIENT
Start: 2022-01-01 | End: 2022-04-12 | Stop reason: HOSPADM

## 2022-01-01 RX ORDER — HEPARIN SODIUM 1000 [USP'U]/ML
1500 INJECTION, SOLUTION INTRAVENOUS; SUBCUTANEOUS ONCE
Status: DISCONTINUED | OUTPATIENT
Start: 2022-01-01 | End: 2022-04-12 | Stop reason: HOSPADM

## 2022-01-01 RX ORDER — NOREPINEPHRINE BIT/0.9 % NACL 16MG/250ML
INFUSION BOTTLE (ML) INTRAVENOUS
Status: COMPLETED
Start: 2022-01-01 | End: 2022-01-01

## 2022-01-01 RX ORDER — 0.9 % SODIUM CHLORIDE 0.9 %
1000 INTRAVENOUS SOLUTION INTRAVENOUS ONCE
Status: DISCONTINUED | OUTPATIENT
Start: 2022-01-01 | End: 2022-04-12 | Stop reason: HOSPADM

## 2022-01-01 RX ORDER — MAGNESIUM SULFATE IN WATER 40 MG/ML
INJECTION, SOLUTION INTRAVENOUS
Status: COMPLETED
Start: 2022-01-01 | End: 2022-01-01

## 2022-01-01 RX ORDER — 0.9 % SODIUM CHLORIDE 0.9 %
1000 INTRAVENOUS SOLUTION INTRAVENOUS ONCE
Status: COMPLETED | OUTPATIENT
Start: 2022-01-01 | End: 2022-01-01

## 2022-01-01 RX ORDER — ONDANSETRON 2 MG/ML
4 INJECTION INTRAMUSCULAR; INTRAVENOUS EVERY 6 HOURS PRN
Status: DISCONTINUED | OUTPATIENT
Start: 2022-01-01 | End: 2022-04-12 | Stop reason: HOSPADM

## 2022-01-01 RX ORDER — SODIUM CHLORIDE 9 MG/ML
INJECTION, SOLUTION INTRAVENOUS PRN
Status: DISCONTINUED | OUTPATIENT
Start: 2022-01-01 | End: 2022-04-12 | Stop reason: HOSPADM

## 2022-01-01 RX ORDER — LEVOTHYROXINE SODIUM ANHYDROUS 200 UG/5ML
200 INJECTION, POWDER, LYOPHILIZED, FOR SOLUTION INTRAVENOUS ONCE
Status: COMPLETED | OUTPATIENT
Start: 2022-01-01 | End: 2022-01-01

## 2022-01-01 RX ORDER — DEXTROSE MONOHYDRATE 25 G/50ML
12.5 INJECTION, SOLUTION INTRAVENOUS PRN
Status: DISCONTINUED | OUTPATIENT
Start: 2022-01-01 | End: 2022-04-12 | Stop reason: HOSPADM

## 2022-01-01 RX ORDER — EPINEPHRINE 0.1 MG/ML
SYRINGE (ML) INJECTION DAILY PRN
Status: COMPLETED | OUTPATIENT
Start: 2022-01-01 | End: 2022-01-01

## 2022-01-01 RX ORDER — LIOTHYRONINE SODIUM 10 UG/ML
20 INJECTION, SOLUTION INTRAVENOUS ONCE
Status: DISCONTINUED | OUTPATIENT
Start: 2022-01-01 | End: 2022-04-12 | Stop reason: HOSPADM

## 2022-01-01 RX ORDER — NICOTINE POLACRILEX 4 MG
15 LOZENGE BUCCAL PRN
Status: DISCONTINUED | OUTPATIENT
Start: 2022-01-01 | End: 2022-04-12 | Stop reason: HOSPADM

## 2022-01-01 RX ORDER — DEXTROSE MONOHYDRATE 100 MG/ML
INJECTION, SOLUTION INTRAVENOUS
Status: COMPLETED
Start: 2022-01-01 | End: 2022-01-01

## 2022-01-01 RX ORDER — DEXTROSE MONOHYDRATE 50 MG/ML
100 INJECTION, SOLUTION INTRAVENOUS PRN
Status: DISCONTINUED | OUTPATIENT
Start: 2022-01-01 | End: 2022-04-12 | Stop reason: HOSPADM

## 2022-01-01 RX ADMIN — SODIUM CHLORIDE 1000 ML: 9 INJECTION, SOLUTION INTRAVENOUS at 18:08

## 2022-01-01 RX ADMIN — HYDROCORTISONE SODIUM SUCCINATE 100 MG: 100 INJECTION, POWDER, FOR SOLUTION INTRAMUSCULAR; INTRAVENOUS at 19:53

## 2022-01-01 RX ADMIN — INSULIN HUMAN 10 UNITS: 100 INJECTION, SOLUTION PARENTERAL at 20:56

## 2022-01-01 RX ADMIN — SODIUM BICARBONATE 50 MEQ: 84 INJECTION INTRAVENOUS at 21:26

## 2022-01-01 RX ADMIN — CALCIUM GLUCONATE 1000 MG: 20 INJECTION, SOLUTION INTRAVENOUS at 21:07

## 2022-01-01 RX ADMIN — EPINEPHRINE 1 MG: 0.1 INJECTION, SOLUTION ENDOTRACHEAL; INTRACARDIAC; INTRAVENOUS at 17:38

## 2022-01-01 RX ADMIN — LEVOTHYROXINE SODIUM ANHYDROUS 200 MCG: 100 INJECTION, POWDER, LYOPHILIZED, FOR SOLUTION INTRAVENOUS at 20:10

## 2022-01-01 RX ADMIN — Medication 5 MCG/MIN: at 18:08

## 2022-01-01 RX ADMIN — DEXTROSE MONOHYDRATE: 100 INJECTION, SOLUTION INTRAVENOUS at 19:12

## 2022-01-01 RX ADMIN — MAGNESIUM SULFATE HEPTAHYDRATE: 40 INJECTION, SOLUTION INTRAVENOUS at 18:21

## 2022-01-01 RX ADMIN — DEXTROSE MONOHYDRATE 12.5 G: 25 INJECTION, SOLUTION INTRAVENOUS at 20:56

## 2022-01-01 RX ADMIN — SODIUM BICARBONATE: 84 INJECTION, SOLUTION INTRAVENOUS at 21:34

## 2022-01-01 ASSESSMENT — PULMONARY FUNCTION TESTS
PIF_VALUE: 31
PIF_VALUE: 33

## 2022-04-11 PROBLEM — I46.9 CARDIAC ARREST (HCC): Status: ACTIVE | Noted: 2022-01-01

## 2022-04-11 NOTE — PROGRESS NOTES
[address] [facility] Pharmacokinetic Monitoring Service - Vancomycin     Kodi Kelly is a 80 y.o. female starting on vancomycin therapy for sepsis. Pharmacy consulted by Dr. Gen Guzman for monitoring and adjustment. Target Concentration: Goal AUC/NIRMAL 400-600 mg*hr/L    Additional Antimicrobials: zosyn    Pertinent Laboratory Values: Wt Readings from Last 1 Encounters:   04/11/22 195 lb (88.5 kg)     Temp Readings from Last 1 Encounters:   04/11/22 (!) 82.4 °F (28 °C)     Estimated Creatinine Clearance: 32 mL/min (A) (based on SCr of 1.65 mg/dL (H)).   Recent Labs     04/11/22  1754 04/11/22  1758   CREATININE 2.70* 1.65*   WBC  --  11.2       Pertinent Cultures:  Culture Date Source Results   4/11 Blood x2 Ordered   4/11 Urine Ordered     Plan:  Patient with CHANEL, will give Vancomycin 1250 mg x1 and dose vancomycin based on levels   Renal labs as indicated   Pharmacy will continue to monitor patient and adjust therapy as indicated    Thank you for the consult,  Berkley Marte, [address] [facility]  4/11/2022 7:27 PM

## 2022-04-11 NOTE — ED NOTES
BS rechecked with results of 10. 1amp dextrose administered via IV     Charlene Hinds RN  04/11/22 1915

## 2022-04-11 NOTE — ED PROVIDER NOTES
[facility] ED     Emergency Department     Faculty Attestation    I performed a history and physical examination of the patient and discussed management with the resident. I reviewed the residents note and agree with the documented findings and plan of care. Any areas of disagreement are noted on the chart. I was personally present for the key portions of any procedures. I have documented in the chart those procedures where I was not present during the key portions. I have reviewed the emergency nurses triage note. I agree with the chief complaint, past medical history, past surgical history, allergies, medications, social and family history as documented unless otherwise noted below. For Physician Assistant/ Nurse Practitioner cases/documentation I have personally evaluated this patient and have completed at least one if not all key elements of the E/M (history, physical exam, and MDM). Additional findings are as noted. Patient brought in by EMS after she was found down unresponsive at home. According to EMS, patient had an initial blood glucose of 30. She was given an amp of dextrose and on recheck it was down into the 20s. Patient was then going to be given glucagon by EMS. Patient was also bradycardic into the 30s. Patient's last known well was around 5 AM this morning. On arrival here, pulse check was done after patient was moved over to the cot and no pulse was palpated. Chest compressions and ACLS was started. Patient was given a dose of epinephrine as well as an additional amp of dextrose and patient had ROSC on next pulse check. Patient was then given etomidate and succinylcholine and was successfully intubated by the resident. Patient had good bilateral breath sounds following intubation. Patient does have chronic appearing wounds to the bilateral lower extremities. A right femoral central line was also placed by resident for better access.   Patient became hypotensive and a liter bolus was initiated. Patient then lost pulses again and another round of CPR was initiated with a dose of epinephrine as well as calcium given. Patient had ROSC on next pulse check. EKG does show a wide QRS rhythm without any significant ST changes. Will give magnesium. Will get CT scan of the head, chest x-ray, labs and speak with critical care. EKG Interpretation    Interpreted by emergency department physician    Rhythm: wide qrs  Rate: normal  Axis: right  Ectopy: premature ventricular contractions (unifocal)  Conduction: nonspecific interventricular conduction block  ST Segments: nonspecific changes  T Waves: non specific changes  Q Waves: nonspecific    Clinical Impression: non-specific EKG    Hiren Bedoya MD    8610: Was called into patient room as patient again lost pulses. Nurse had begun chest compressions. However, after several seconds the critical care resident came into the room and stated that the family had changed CODE STATUS to DNR CC arrest.  There is a signed DNR CC arrest form in the room that the critical care resident had filled out with family several minutes prior to this arrest.  The decision was made by myself, critical care resident, and nurse in the room to stop compressions. I did palpate again for a pulse and none was felt. Time of death was called at 2214.     Brina Cunningham MD  Attending Emergency  Physician              Hiren Bedoya MD  04/11/22 [address] [facility] Seda Martell MD  04/11/22 9361

## 2022-04-11 NOTE — ED NOTES
BS checked. 14. 1 amp dextrose administered. Resident notified.       Hemal Catherine, JAZ  04/11/22 3226

## 2022-04-11 NOTE — ED NOTES
Repeat BS from new \[facility]\"" to left hand. Reading 268.       Andrés Moore, RN  04/11/22 6426

## 2022-04-11 NOTE — ED NOTES
Pt presented to ED via [address]. Pt was found unresponsive with LKW at 0500. Pt BS was 30. 1 amp dextrose administered. Pt presents with sores noted to left shin, right shin and buttocks.  Pt feels very cold on arrival      [facility]  04/11/22 1904

## 2022-04-11 NOTE — PROCEDURES
PROCEDURE NOTE - CENTRAL VENOUS LINE PLACEMENT    PATIENT NAME: [facility] RECORD NO. [id number]  DATE: 4/11/2022  ATTENDING PHYSICIAN: Dr. Gen Cadena DIAGNOSIS:  Need for IV access  POSTOPERATIVE DIAGNOSIS:  Same  PROCEDURE PERFORMED:  Right Femoral Vein Central Line Insertion  PERFORMING PHYSICIAN: Venu Moise DO  ANESTHESIA:  Local utilizing 1% lidocaine  ESTIMATED BLOOD LOSS:  Less than 25 ml  COMPLICATIONS:  None immediately appreciated. DISCUSSION:  Anya Coronado is a 80y.o.-year-old female who requires central IV access. The history and physical examination were reviewed and confirmed. The diagnoses, proposed procedure, risks, possible complications, benefits and alternatives were discussed with the patient or family was unable to be obtained due to emergent nature of procedure. The patient was then prepared for the procedure. PROCEDURE:  A timeout was initiated by the bedside nurse and was confirmed by those present. The patient was placed in a supine position. The skin overlying the Right Femoral Vein was prepped with chlorhexadine and draped in sterile fashion. The skin was infiltrated with local anesthetic. The vessel and surrounding anatomy was visualized using ultrasound. Through the anesthetized region, the introducer needle was inserted into the femoral vein returning dark red non pulsatile blood. A guidewire was placed through the center of the needle with no resistance. Ultrasound confirmed presence of wire in the vein. A small incision made in the skin with a #11 scalpel blade. The dilator was inserted into the skin and vein over guidewire using Seldinger technique. The dilator was then removed and the 7 F 20 cm catheter was placed in the vein over the guidewire using Seldinger technique. The guidewire was then removed and all ports aspirated and flushed appropriately.  The catheter then secured using silk suture and a temporary sterile dressing was applied. No immediate complication was evident. All sponge, instrument and needle counts were correct at the completion of the procedure. The patient tolerated the procedure well with no immediate complication evident.      Nelda Mcneil DO  6:08 PM, 4/11/22     Electronically signed by Nelda Mcneil DO on 4/11/2022 at 6:55 PM

## 2022-04-11 NOTE — PLAN OF CARE
Problem: OXYGENATION/RESPIRATORY FUNCTION  Goal: Patient will maintain patent airway  Outcome: Ongoing  Goal: Patient will achieve/maintain normal respiratory rate/effort  Description: Respiratory rate and effort will be within normal limits for the patient  Outcome: Ongoing     Problem: MECHANICAL VENTILATION  Goal: Patient will maintain patent airway  Outcome: Ongoing  Goal: Patient will maintain patent airway  Outcome: Ongoing  Goal: Oral health is maintained or improved  Outcome: Ongoing  Goal: ET tube will be managed safely  Outcome: Ongoing  Goal: Ability to express needs and understand communication  Outcome: Ongoing  Goal: Mobility/activity is maintained at optimum level for patient  Outcome: Ongoing     Problem: SKIN INTEGRITY  Goal: Skin integrity is maintained or improved  Outcome: Ongoing

## 2022-04-11 NOTE — ED NOTES
Pts family members at bedside. Critical care resident at bedside.       Barbara Josue RN  04/11/22 3200

## 2022-04-11 NOTE — ED NOTES
Blood glucose rechecked-reading \"LO\". 1 amp dextrose given IVP per central line.       Saintclair Jules, RN  04/11/22 1946

## 2022-04-12 LAB
CORTISOL: 64 UG/DL (ref 2.7–18.4)
EKG ATRIAL RATE: 98 BPM
EKG Q-T INTERVAL: 354 MS
EKG QRS DURATION: 164 MS
EKG QTC CALCULATION (BAZETT): 454 MS
EKG R AXIS: 105 DEGREES
EKG T AXIS: -16 DEGREES
EKG VENTRICULAR RATE: 99 BPM
GLUCOSE BLD-MCNC: 202 MG/DL (ref 65–105)
GLUCOSE BLD-MCNC: 245 MG/DL (ref 65–105)
GLUCOSE BLD-MCNC: 268 MG/DL (ref 65–105)
GLUCOSE BLD-MCNC: <10 MG/DL (ref 65–105)
GLUCOSE BLD-MCNC: <10 MG/DL (ref 65–105)

## 2022-04-12 NOTE — ED NOTES
Pt with spontaneous eye opening and moving lips. Pt is not breathing over ventilator. Pt remains unsedated.       Carrie Sorensen RN  04/11/22 2036

## 2022-04-12 NOTE — FLOWSHEET NOTE
[facility] [address]   Patient Death Note  DEATH   Shift date: 2022    Shift day: Monday  Shift #: 2                 Room #    Name: Stan Pacheco            Age: 80 y.o. Gender: female          Voodoo: Congregational      Place of Pentecostalism: unknown  Admit Date & Time: 2022  5:40 PM     Referral: unknown   Actual date of death: 2022   TOD: 2214       SITUATION AT DEATH:  Patient changed to [facility]     IS THIS A 'S CASE? No    SPIRITUAL/EMOTIONAL INTERVENTION:   provided space for feelings, thoughts, and concerns. Remained present with family during grieving process. Provided updates as needed. Facilitated family conference. Provided spiritual presence. Determined support is available. Initiated end-of-life care. Assisted with release of body form. Escorted family bedside with patient. Family Received Grief Packet? No       NAME AND PHONE NUMBER OF DOCTOR SIGNING DEATH CERTIFICATE:  Freddy Davis [phone]      Copy of COMPLETED Release of Body Form Received? Yes    Patient's belongings: None     HOME:  Name: [address] [place] [address] Drive: unknown  Phone Number: unknown    NEXT OF KIN:  Name: Jefry Ansari  Relationship: Daughter  Street Address: [place] [address]. City: [place]: [facility]  Zip code: [address]   Phone Number: [phone]    ANY FOLLOW-UP NEEDED? Yes    IF SO, WHAT? Chaplains will remain available to offer spiritual and emotional support as needed. Electronically signed by Nikolas Vargas on 2022 at 2:25 AM.  [address]  [phone]       22   Encounter Summary   Services provided to: Patient; Family   Referral/Consult From: Other    Support System Children   Continue Visiting   ([phone])   Complexity of Encounter Moderate   Length of Encounter 45 minutes   Spiritual Assessment Completed Yes   Grief and Life Adjustment   Type Death   Assessment Calm;Approachable;Coping;Grieving   Intervention Active listening;Explored feelings, thoughts, concerns;Explored coping resources; Discussed illness/injury and it's impact;Sustaining presence/ Ministry of presence   Outcome Expressed gratitude;Expressed feelings/needs/concerns;Engaged in conversation     Electronically signed by Clara Gomez on 4/12/2022 at 2:25 AM

## 2022-04-12 NOTE — ED NOTES
Resident speaks with pts family in waiting room regarding pt condition.       Nathalia Elena RN  04/11/22 4192

## 2022-04-12 NOTE — PROGRESS NOTES
Pharmacy Note     Renal Dose Adjustment    Arielle Vergara is a 80 y.o. female. Pharmacist assessment of renally cleared medications. Recent Labs     04/11/22 1758   BUN 71*       Recent Labs     04/11/22 1758 04/11/22 2024   CREATININE 1.65* 2.84*       Estimated Creatinine Clearance: 19 mL/min (A) (based on SCr of 2.84 mg/dL (H)).   Estimated CrCl using Ideal Body Weight: 16 mL/min (based on IBW 63.88 kg)    Height:   Ht Readings from Last 1 Encounters:   04/11/22 5' 8\" (1.727 m)     Weight:  Wt Readings from Last 1 Encounters:   04/11/22 195 lb (88.5 kg)       The following medication dose has been adjusted based upon renal function per P&T Guidelines:             Famotidine 20mg IV twice daily to once daily

## 2022-04-12 NOTE — DISCHARGE SUMMARY
[facility]     Department of Internal Medicine - Critical Care Service    INPATIENT DEATH SUMMARY      PATIENT IDENTIFICATION:  NAME:  Mateo Oakes   :   1940  MRN:    [id number]     Acct:    [de-identified]   Admit Date:  2022  Discharge date:  No discharge date for patient encounter. Attending Provider: Lluvia Mcguire MD                                     Principal Problem:    Cardiac arrest [facility])  Resolved Problems:    * No resolved hospital problems. *       REASON FOR HOSPITALIZATION:   Chief Complaint   Patient presents with   Anjali Balloon Course    Ms. Yessi Warren is a 79 yo female with PMH significant for HTN, HLD, PE, arthritis, asthma, CHF who was brought in by EMS after she was found down unresponsive at home. According to EMS, patient had an initial blood glucose of 30. She was given an amp of dextrose and on recheck it was down into the 20s. Patient was then going to be given glucagon by EMS. Patient was also bradycardic into the 30s. As per report, patient had 3 episodes of cardiac arrest (PEA, asystole and again PEA) 2 of which were witnessed in the ER. She was administered calcium chloride, calcium gluconate, bicarb, amps of D50, IV epinephrine during cardiac arrest and ROSC was obtained. Patient was intubated, on PRVC mode RR 16/ /PEEP 8/FiO2 100 with venous blood gas showing pH 6.92, PCO2 82.1, bicarb 16.3. She was started on epinephrine gtt and Levophed gtt for pressor support. Of note, patient was hypothermic since time of admission, temp 82.2F. Was started with passive rewarming with german hugger    Labs were significant for hyperkalemia with R7.2, metabolic acidosis with bicarb 16, lactic acid 13.5, elevated creatinine 1.65, BUN 71.   proBNP 10,835, troponin I 24, , myoglobin 4549. TSH was elevated at 7.51, free T4 0.61  Hemoglobin 12.8  PTT 68.6, INR 4.0  EKG showed wide QRS complex but no ST segment changes.   Patient was given 2 g IV magnesium. She was kept on dextrose 10% infusion for hypoglycemia. IV zosyn and vancomycin were ordered but not started. Arterial line, femoral central line were placed for hemodynamic monitoring and IV access. Patient received a total of 3L normal saline boluses in the ER as per report. She was started on IV bicarb 150 meQ in D5 at 150 mL/h. 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  CT head was ordered, cardiology was consulted but due to thrombocytopenia with platelet count 21, heparin drip could not be started for elevated troponins. Nephrology was consulted for POC repeat K 7.8, Lokelma 10 mg 1 dose was ordered but patient did not receive this. As per nephrology recommendations, 2 amps of IV bicarb were pushed, Karina Kidney catheter was attempted for emergent hemodialysis but was unsuccessful due to persistent bleeding secondary to coagulopathy, thrombocytopenia. Vent settings were changed; RR was increased to 28 due to venous blood gas showing mixed respiratory and metabolic acidosis, pH 5.83, PCO2 93.5, bicarb 15.8. At this time, after extensive discussion with family (patient did not have a current spouse, had 4 children, out of which 2 were physically in the ER and 1 was available via phone), 3 out of her 4 children wanted CODE STATUS change to DNR CCA (no chest compressions). Code documentation was completed and witnessed by . Patient again went into cardiac arrest, PEA. Due to change in CODE STATUS to DNR CCA (no chest compressions), CPR which had been started initially by ER RN was stopped. Time of death was called by ER attending physician Dr. Maureen Gentile at 7884.       Consults:   cardiology and nephrology    Procedures: Arterial line placement, central line placement, attempted Marck placement, intubation with mechanical ventilation    Any Hospital Acquired Infections: none    PATIENT'S DISCHARGE CONDITION:        Disposition: Makenna Suarez

## 2022-04-12 NOTE — SIGNIFICANT EVENT
I had a long discussion with the patient's family in person, in presence of Viviana Hannon. Patient's current clinical condition, laboratory and radiographic findings as well as recommendations of physicians consulted on the case were discussed with the patient's family in detail in simple [place]. All questions and concerns of the family were addressed, and appropriate emotional support was provided. After understanding patient's current medical condition, family decided that they wanted to continue the ongoing treatment but in case patient's heart stops (cardiac arrest), they do not want any chest compressions or other similar  resuscitative measures to be performed on the patient. They requested that if such a condition arises, the patient should be made comfortable and nature should be allowed to take its course. They asked that patient's code status should be changed to [facility] (no CPR), and signed the [facility] order form in my presence, which was witnessed by Viviana Hannon. Will honor family's wishes, and will change patient's code status to [facility] (no chest compressions). Miranda Hayes MD, M.D.   Department of Internal Medicine,  [facility])             4/11/2022, 9:40 PM

## 2022-04-12 NOTE — FLOWSHEET NOTE
[address]     Emergency/Trauma Note    PATIENT NAME: Kaylyn Avalos    Shift date:   Shift day: Monday   Shift # 2    Room #    Name: Kaylyn Avalos            Age: 80 y.o. Gender: female          Moravian: Adventism   Place of Anglican: unknown    Trauma/Incident type: Full Arrest  Admit Date & Time: 2022  5:40 PM  TRAUMA NAME: None    ADVANCE DIRECTIVES IN CHART? No    NAME OF DECISION MAKER: None    RELATIONSHIP OF DECISION MAKER TO PATIENT: None    PATIENT/EVENT DESCRIPTION:  Kaylyn Avalos is a 80 y.o. female who arrived as a FULL ARREST. Pt to be admitted to . SPIRITUAL ASSESSMENT/INTERVENTION:  Patient appears to be unresponsive at this time. Family includes four children and several grandchildren. Two daughters are present and one is able to be reached by phone. Family is tearful and in a state of anticipatory grief. There was some difficulty determining code status but was decided upon. Patient  and family notified.  brought two family members bedside after death. Contacted daughter to explain the end of life process. Provided space for feelings, thoughts, and concerns. Provided grief support. PATIENT BELONGINGS:  No belongings noted    ANY BELONGINGS OF SIGNIFICANT VALUE NOTED:  None    REGISTRATION STAFF NOTIFIED? Yes      WHAT IS YOUR SPIRITUAL CARE PLAN FOR THIS PATIENT?:  Chaplains will remain available to offer spiritual and emotional support as needed.       Electronically signed by Andree Blank on 2022 at 2:20 AM.  [address]  [phone]       [phone]   Encounter Summary   Services provided to: Family   Referral/Consult From: Multi-disciplinary team   Support System Children;Family members   Continue Visiting   ([phone])   Complexity of Encounter High   Length of Encounter 3 hours   Spiritual Assessment Completed Yes   Crisis Type ED Alert   Assessment Tearful; Anxious; Fearful;Denial;Shock; Despair; Anticipatory grief;Helplessness;Guilt; Angry; Hopeful   Intervention Active listening;Explored feelings, thoughts, concerns;Explored coping resources; Discussed illness/injury and it's impact; Discussed belief system/[employer] practices/tray;Grief care;Sustaining presence/ Ministry of presence   Outcome Expressed feelings/needs/concerns;Engaged in conversation;Expressed gratitude     Electronically signed by Sonya Frank on 4/12/2022 at 2:20 AM

## 2022-04-12 NOTE — ED PROVIDER NOTES
[facility] ED  Emergency Department Encounter  EmergencyMedicine Resident     Pt Liseth Spence  MRN: [id number]  Jwgfjohnny 1940  Date of evaluation: 4/11/22  PCP:  [facility] Aparicio - [address]       Chief Complaint   Patient presents with    Cardiac Arrest       HISTORY OF PRESENT ILLNESS  (Location/Symptom, Timing/Onset, Context/Setting, Quality, Duration, Modifying Factors, Severity.)      Sosa Lei is a 80 y.o. female who presents with altered mental status and bradycardia. EMS was called to scene due to altered mental status. Patient was found to be bradycardic, hypoglycemic in the 30s. Patient was given amp of D50 through IO with a drop in her glucose down to 26. Patient was noted to be bradycardic and continue being bradycardic. Patient was being bagged as brought into the room, patient was noted to have no pulses in chest compression was started. Review of systems unable to be obtained due to patient's altered mental status. EMS gave D50. PAST MEDICAL / SURGICAL / SOCIAL / FAMILY HISTORY      has a past medical history of Arthritis, Asthma, Congestive heart failure (CHF) ([facility] [address] Hyperlipidemia, Hypertension, and Pulmonary embolism ([address] No additional pertinent     has a past surgical history that includes Rotator cuff repair (Left); Colonoscopy; Colonoscopy (N/A, 1/15/2019); Upper gastrointestinal endoscopy (N/A, 1/15/2019); and Colonoscopy (1/16/2019). No additional pertinent    Social History     Socioeconomic History    Marital status:       Spouse name: Not on file    Number of children: Not on file    Years of education: Not on file    Highest education level: Not on file   Occupational History    Not on file   Tobacco Use    Smoking status: Former Smoker     Packs/day: 1.00    Smokeless tobacco: Never Used    Tobacco comment: smoked 16 years  none in 43 years   Vaping Use    Vaping Use: Never used   Substance and Sexual Activity    Alcohol use: No    Drug use: No    Sexual activity: Never   Other Topics Concern    Not on file   Social History Narrative    Not on file     Social Determinants of Health     Financial Resource Strain:     Difficulty of Paying Living Expenses: Not on file   Food Insecurity:     Worried About Running Out of Food in the Last Year: Not on file    Dulce of Food in the Last Year: Not on file   Transportation Needs:     Lack of Transportation (Medical): Not on file    Lack of Transportation (Non-Medical): Not on file   Physical Activity:     Days of Exercise per Week: Not on file    Minutes of Exercise per Session: Not on file   Stress:     Feeling of Stress : Not on file   Social Connections:     Frequency of Communication with Friends and Family: Not on file    Frequency of Social Gatherings with Friends and Family: Not on file    Attends Spiritism Services: Not on file    Active Member of [address] [place] [address] [employer] or Organizations: Not on file    Attends Club or Organization Meetings: Not on file    Marital Status: Not on file   Intimate Partner Violence:     Fear of Current or Ex-Partner: Not on file    Emotionally Abused: Not on file    Physically Abused: Not on file    Sexually Abused: Not on file   Housing Stability:     Unable to Pay for Housing in the Last Year: Not on file    Number of [place] in the Last Year: Not on file    Unstable Housing in the Last Year: Not on file       No family history on file. Allergies:  Morphine and Sulfa antibiotics    Home Medications:  Prior to Admission medications    Medication Sig Start Date End Date Taking?  Authorizing Provider   metoprolol succinate (TOPROL XL) 25 MG extended release tablet Take 1 tablet by mouth daily 4/19/21   Jose Nicholas MD   amLODIPine (NORVASC) 5 MG tablet Take 1 tablet by mouth daily 4/20/21   Jose Nicholas MD   ferrous sulfate 325 (65 Fe) MG EC tablet Take 1 tablet by mouth 2 times daily (with meals) 1/17/19   Tamica Gavin MD   albuterol sulfate  (90 Base) MCG/ACT inhaler Inhale 2 puffs into the lungs every 4 hours as needed for Wheezing 1/17/19   Anaid Okeefe MD   potassium chloride (MICRO-K) 10 MEQ extended release capsule Take 20 mEq by mouth 2 times daily     Historical Provider, MD   montelukast (SINGULAIR) 10 MG tablet Take 5 mg by mouth nightly     Historical Provider, MD   aspirin 81 MG EC tablet Take 81 mg by mouth daily    Historical Provider, MD       REVIEW OF SYSTEMS    (2-9 systems for level 4, 10 or more for level 5)      Review of Systems   Unable to perform ROS: Patient unresponsive     PHYSICAL EXAM   (up to 7 for level 4, 8 or more for level 5)      INITIAL VITALS:   BP 95/71   Pulse 83   Temp (!) 82.4 °F (28 °C) (Core)   Resp 18   Ht 5' 8\" (1.727 m)   Wt 195 lb (88.5 kg)   SpO2 (!) 89%   BMI 29.65 kg/m²     Physical Exam  Constitutional:       General: She is in acute distress. Appearance: She is obese. She is ill-appearing. HENT:      Mouth/Throat:      Mouth: Mucous membranes are dry. Comments: Blood in oropharynx on intubation. Eyes:      Conjunctiva/sclera: Conjunctivae normal.      Comments: Pupils 3 mm and reactive   Cardiovascular:      Rate and Rhythm: Bradycardia present. Rhythm irregular. Pulses: Normal pulses. Pulmonary:      Effort: Respiratory distress present. Abdominal:      General: Bowel sounds are normal. There is no distension. Tenderness: There is no abdominal tenderness. There is no guarding. Musculoskeletal:         General: Normal range of motion. Right lower leg: Edema present. Left lower leg: Edema present. Comments: Range of motion noted to be normal with patient's natural movements   Skin:     General: Skin is warm and dry. Findings: Erythema and lesion (Multiple lesions on the bilateral lower extremities) present. No rash (On exposed skin). Neurological:      Mental Status: She is unresponsive. GCS: GCS eye subscore is 1. GCS verbal subscore is 1. GCS motor subscore is 1.          DIFFERENTIAL  DIAGNOSIS     PLAN (LABS / IMAGING / EKG):  Orders Placed This Encounter   Procedures    Culture, Urine    Culture, Blood 1    Culture, Blood 2    XR CHEST PORTABLE    CT Head WO Contrast    CBC with Auto Differential    Comprehensive Metabolic Panel w/ Reflex to MG    Protime-INR    APTT    Urinalysis with Microscopic    Lactic Acid    TSH with Reflex    Brain Natriuretic Peptide    Troponin    BLOOD GAS, VENOUS    Immature Platelet Fraction    CK    Myoglobin, Serum    T4, Free    ELECTROLYTES PLUS    Hemoglobin and hematocrit, blood    CALCIUM, IONIC (POC)    Glucose, Random    Cortisol Total    Comprehensive Metabolic Panel w/ Reflex to MG    Lactate, Sepsis    CBC with Auto Differential    ELECTROLYTES PLUS    Hemoglobin and hematocrit, blood    CALCIUM, IONIC (POC)    Diet NPO    Tube insertion NG    HYPOGLYCEMIA TREATMENT: blood glucose less than 50 mg/dL and patient  ALERT and TOLERATING PO    HYPOGLYCEMIA TREATMENT: blood glucose less than 70 mg/dL and patient ALERT and TOLERATING PO    HYPOGLYCEMIA TREATMENT: blood glucose less than 70 mg/dL and patient NOT ALERT or NPO    Vital signs per unit routine    Telemetry monitoring - continuous duration    Notify physician    Strict Bedrest    Daily weights    Intake and output    Neurovascular checks    Monitor for signs/symptoms of urinary retention    Full Code    Pharmacy to Dose: Vancomycin    Inpatient consult to Critical Care    Inpatient consult to Nephrology    Mechanical ventilation    Initiate Oxygen Therapy Protocol    POCT Glucose    Venous Blood Gas, POC    Creatinine W/GFR Point of Care    POCT urea (BUN)    Lactic Acid, POC    POCT Glucose    POCT Glucose    POCT Glucose    POCT Glucose    POCT Glucose    POC Glucose Fingerstick    POC Glucose Fingerstick    POC Glucose Fingerstick  Venous Blood Gas, POC    Creatinine W/GFR Point of Care    POCT urea (BUN)    Lactic Acid, POC    POCT Glucose    EKG 12 Lead    EKG 12 lead    Insert peripheral IV    Insert/Change Pinto Catheter    ADMIT TO INPATIENT       MEDICATIONS ORDERED:  Orders Placed This Encounter   Medications    EPINEPHrine 1 MG/10ML injection    0.9 % sodium chloride bolus    norepinephrine-sodium chloride (LEVOPHED) 16-0.9 MG/250ML-% infusion     Shalom Fleming: cabinet override    calcium gluconate 1000 mg in sodium chloride 50 mL    magnesium sulfate 2 GM/50ML infusion     Shalom Fleming: cabinet override    piperacillin-tazobactam (ZOSYN) 3,375 mg in dextrose 5 % 50 mL IVPB (mini-bag)     Order Specific Question:   Antimicrobial Indications     Answer:   Sepsis of Unknown Etiology    vancomycin (VANCOCIN) 1250 mg in sodium chloride 0.9% 250 mL IVPB     Order Specific Question:   Antimicrobial Indications     Answer:   Sepsis of Unknown Etiology    vancomycin (VANCOCIN) intermittent dosing (placeholder)     Order Specific Question:   Antimicrobial Indications     Answer:   Sepsis of Unknown Etiology    EPINEPHrine 1 MG/10ML injection     Makayla Santana: cabinet override    AND Linked Order Group     insulin regular (HUMULIN R;NOVOLIN R) injection 10 Units     dextrose 50 % IV solution    glucose (GLUTOSE) 40 % oral gel 15 g    dextrose 50 % IV solution    glucagon (rDNA) injection 1 mg    dextrose 5 % solution    dextrose 10 % infusion     Shalom Fleming: cabinet override    levothyroxine (SYNTHROID) injection 200 mcg     Order Specific Question:   Is levothyroxine IV being used to treat myxedema coma? Answer: Yes    EPINEPHrine (EPINEPHrine HCL) 5 mg in dextrose 5 % 250 mL infusion     Order Specific Question:   Titrate Infusion? Answer:   Yes     Order Specific Question:   Initial Infusion Dose:      Answer:   2 mcg/min     Order Specific Question:   Goal of Therapy is: Answer:   MAP greater than 65 mmHg     Order Specific Question:   Contact Provider if:     Answer:   Patient is receiving the maximum dose and is not achieving the goal of therapy    norepinephrine (LEVOPHED) 16 mg in sodium chloride 0.9 % 250 mL infusion (non-weight-based)     Order Specific Question:   Titrate Infusion? Answer:   Yes     Order Specific Question:   Initial Infusion Dose: Answer:   5 mcg/min     Order Specific Question:   Goal of Therapy is:      Answer:   MAP greater than 65 mmHg     Order Specific Question:   Contact Provider if:     Answer:   Patient is receiving the maximum dose and is not achieving the goal of therapy    dextrose 10 % infusion    hydrocortisone sodium succinate PF (SOLU-CORTEF) injection 100 mg    sodium chloride flush 0.9 % injection 5-40 mL    sodium chloride flush 0.9 % injection 5-40 mL    0.9 % sodium chloride infusion    OR Linked Order Group     ondansetron (ZOFRAN-ODT) disintegrating tablet 4 mg     ondansetron (ZOFRAN) injection 4 mg    polyethylene glycol (GLYCOLAX) packet 17 g    OR Linked Order Group     acetaminophen (TYLENOL) tablet 650 mg     acetaminophen (TYLENOL) suppository 650 mg    famotidine (PEPCID) injection 20 mg    sodium zirconium cyclosilicate (LOKELMA) oral suspension 10 g    sodium bicarbonate 150 mEq in dextrose 5 % 1,000 mL infusion    liothyronine (TRIOSTAT) injection 20 mcg    hydrocortisone sodium succinate PF (SOLU-CORTEF) injection 100 mg       DDX: Cardiac arrest, sepsis, myxedema coma, traumatic head injury,    DIAGNOSTIC RESULTS / EMERGENCY DEPARTMENT COURSE / MDM   LAB RESULTS:  Results for orders placed or performed during the hospital encounter of 04/11/22   CBC with Auto Differential   Result Value Ref Range    WBC 11.2 3.5 - 11.3 k/uL    RBC 3.88 (L) 3.95 - 5.11 m/uL    Hemoglobin 12.8 11.9 - 15.1 g/dL    Hematocrit 42.8 36.3 - 47.1 %    .3 (H) 82.6 - 102.9 fL    MCH 33.0 25.2 - 33.5 pg    MCHC 29.9 28.4 - 34.8 g/dL    RDW 19.2 (H) 11.8 - 14.4 %    Platelets See Reflexed IPF Result 138 - 453 k/uL    NRBC Automated 0.4 (H) 0.0 per 100 WBC    Immature Granulocytes 0 0 %    Seg Neutrophils 88 (H) 36 - 66 %    Lymphocytes 9 (L) 24 - 44 %    Monocytes 3 1 - 7 %    Eosinophils % 0 (L) 1 - 4 %    Basophils 0 0 - 2 %    Absolute Immature Granulocyte 0.00 0.00 - 0.30 k/uL    Segs Absolute 9.85 (H) 1.8 - 7.7 k/uL    Absolute Lymph # 1.01 1.0 - 4.8 k/uL    Absolute Mono # 0.34 0.1 - 0.8 k/uL    Absolute Eos # 0.00 0.0 - 0.4 k/uL    Basophils Absolute 0.00 0.0 - 0.2 k/uL    Morphology ANISOCYTOSIS PRESENT     Morphology MACROCYTOSIS PRESENT     Morphology 1+ ACANTHOCYTES     Morphology 1+ POLYCHROMASIA    Comprehensive Metabolic Panel w/ Reflex to MG   Result Value Ref Range    Glucose 270 (H) 70 - 99 mg/dL    BUN 71 (H) 8 - 23 mg/dL    CREATININE 1.65 (H) 0.50 - 0.90 mg/dL    Calcium 10.4 8.6 - 10.4 mg/dL    Sodium 137 135 - 144 mmol/L    Potassium 6.9 (HH) 3.7 - 5.3 mmol/L    Chloride 104 98 - 107 mmol/L    CO2 16 (L) 20 - 31 mmol/L    Anion Gap 17 9 - 17 mmol/L    Alkaline Phosphatase 138 (H) 35 - 104 U/L    ALT 55 (H) 5 - 33 U/L    AST 97 (H) <32 U/L    Total Bilirubin 5.10 (H) 0.3 - 1.2 mg/dL    Total Protein 4.6 (L) 6.4 - 8.3 g/dL    Albumin 2.3 (L) 3.5 - 5.2 g/dL    Albumin/Globulin Ratio 1.0 1.0 - 2.5    GFR Non-African American 30 (L) >60 mL/min    GFR  36 (L) >60 mL/min    GFR Comment         Protime-INR   Result Value Ref Range    Protime 38.4 (H) 9.1 - 12.3 sec    INR 4.0    APTT   Result Value Ref Range    PTT 68.6 (H) 20.5 - 30.5 sec   Lactic Acid   Result Value Ref Range    Lactic Acid, Whole Blood 13.5 (H) 0.7 - 2.1 mmol/L   TSH with Reflex   Result Value Ref Range    TSH 7.51 (H) 0.30 - 5.00 uIU/mL   Brain Natriuretic Peptide   Result Value Ref Range    Pro-BNP 10,835 (H) <300 pg/mL   Troponin   Result Value Ref Range    Troponin, High Sensitivity 124 (HH) 0 - 14 ng/L   BLOOD GAS, VENOUS Value Ref Range    POC Creatinine 2.70 (H) 0.51 - 1.19 mg/dL    GFR Comment 21 (L) >60 mL/min    GFR Non- 17 (L) >60 mL/min    GFR Comment         POCT urea (BUN)   Result Value Ref Range    POC BUN 79 (H) 8 - 26 mg/dL   Lactic Acid, POC   Result Value Ref Range    POC Lactic Acid 12.27 (H) 0.56 - 1.39 mmol/L   POCT Glucose   Result Value Ref Range    POC Glucose 118 (H) 74 - 100 mg/dL   POC Glucose Fingerstick   Result Value Ref Range    POC Glucose 14 (LL) 65 - 105 mg/dL   POC Glucose Fingerstick   Result Value Ref Range    POC Glucose 10 (LL) 65 - 105 mg/dL   POC Glucose Fingerstick   Result Value Ref Range    POC Glucose 55 (L) 65 - 105 mg/dL   Venous Blood Gas, POC   Result Value Ref Range    pH, Eladio 6.837 (LL) 7.320 - 7.430    pCO2, Eladio 93.5 (HH) 41.0 - 51.0 mm Hg    pO2, Eladio 38.9 30.0 - 50.0 mm Hg    HCO3, Venous 15.8 (L) 22.0 - 29.0 mmol/L    Negative Base Excess, Eladio 18 (H) 0.0 - 2.0    O2 Sat, Eladio 36 (L) 60.0 - 85.0 %   Creatinine W/GFR Point of Care   Result Value Ref Range    POC Creatinine 2.84 (H) 0.51 - 1.19 mg/dL    GFR Comment 19 (L) >60 mL/min    GFR Non- 16 (L) >60 mL/min    GFR Comment         POCT urea (BUN)   Result Value Ref Range    POC BUN 71 (H) 8 - 26 mg/dL   Lactic Acid, POC   Result Value Ref Range    POC Lactic Acid 16.10 (H) 0.56 - 1.39 mmol/L   POCT Glucose   Result Value Ref Range    POC Glucose 278 (H) 74 - 100 mg/dL       RADIOLOGY:  XR CHEST PORTABLE   Final Result   1. ET tube in place, tip 3 cm above the lonny   2. Marked enlargement of the cardiac silhouette, differential considerations   include pan chamber enlargement versus large pericardial effusion   3.  Small right pleural effusion, with mild to moderate vascular congestion         CT Head WO Contrast    (Results Pending)          EKG  EKG Interpretation    Interpreted by emergency department physician    Rhythm: normal sinus   Rate: normal  Axis: right  Ectopy: none  Conduction: Wide-complex  ST Segments: nonspecific changes  T Waves: non specific changes  Q Waves: nonspecific    Clinical Impression: non-specific EKG    Corby Gonzalez DO     All EKG's are interpreted by the Emergency Department Physician who either signs or Co-signs this chart in the absence of a cardiologist.      PROCEDURES:  PROCEDURE NOTE - EMERGENCY INTUBATION    PATIENT NAME: [facility] RECORD NO. [id number]  DATE: 4/11/2022  ATTENDING PHYSICIAN: Dr Chayito Zelaya DIAGNOSIS:  Acute Respiratory Failure  POSTOPERATIVE DIAGNOSIS:  Same  PROCEDURE PERFORMED:  Emergency endotracheal intubation  PERFORMING PHYSICIAN: Corby Gonzalez DO    MEDICATIONS: etomidate intravenously and succinycholine intravenously    DISCUSSION:  Ruperto Wade is a 80y.o.-year-old female who requires intubation and ventilatory support due to comatose state, hypoxia and airway protection. The history and physical examination were reviewed and confirmed. CONSENT: Unable to be obtained due to patient's condition. PROCEDURE:  A timeout was initiated by the bedside nurse and was confirmed by those present. The patient was placed in the appropriate position. Cricoid pressure was utilized. Intubation was performed by direct laryngoscopy using a laryngoscope and a 7.5 cuffed endotracheal tube. The cuff was then inflated and the tube was secured appropriately at a distance of 24 cm to the dental ridge. Initial confirmation of placement included bilateral breath sounds, an end tidal CO2 detector and tube fogging. A chest x-ray to verify correct placement of the tube showed appropriate tube position. The patient tolerated the procedure with difficulty.      COMPLICATIONS:  multiple attempts and hypoxia     Corby Gonzalez DO  8:51 PM, 4/11/22  CONSULTS:  PHARMACY TO DOSE VANCOMYCIN  IP CONSULT TO CRITICAL CARE  IP CONSULT TO NEPHROLOGY    MEDICAL DECISION MAKING:  Patient presenting with bradycardic and hypoglycemic, pulses were lost on arrival to the emergency department chest compressions started, ACLS was ran. Patient was intubated during first round of ACLS. Patient had 3 episodes of ACLS performed due to intermittent loss of pulses. He rounds of ACLS patient received calcium chloride, bicarb, multiple doses of epinephrine. Patient was started on nor epi and epinephrine drips with titration dosaging. Patient continued to be hypoglycemic in the emergency department. Concern for sepsis as patient noted to have open ulcerations on her lower extremities. Zosyn and vancomycin were started as antibiotics. Labs were obtained. Patient was noted to be hypothyroid, patient given 200 mcg of levothyroxine and 100 mg of Solu-Cortef for concerns of myxedema coma. Multiple conversations held with family about CODE STATUS and plan of care. Patient continued to be full code at this time. Patient mated to ICU for concerns of myxedema coma versus sepsis causing cardiac arrest.    CRITICAL CARE:  Please see attending note  50 minutes of critical care time    FINAL IMPRESSION      1. Cardiac arrest [facility])          DISPOSITION / PLAN     DISPOSITION Admitted 04/11/2022 08:38:07 PM      PATIENT REFERRED TO:  No follow-up provider specified.     DISCHARGE MEDICATIONS:  New Prescriptions    No medications on file       She Funes, [address], DO  Emergency Medicine Resident    (Please note that portions of thisnote were completed with a voice recognition program.  Efforts were made to edit the dictations but occasionally words are mis-transcribed.)       Ashtyn Ortiz DO  Resident  04/11/22 [id number]

## 2022-04-12 NOTE — ED NOTES
Pts end tidal decreasing below 10. BP 60/40's. RN notifies resident whom has RN initiate chest compressions. Approx 30 seconds into chest compressions ICU resident notifies RN that pt is DNR-CCA. RN told to discontinue chest compressions.       Millie Kowalski RN  04/11/22 [id number]

## 2022-04-12 NOTE — ED PROVIDER NOTES
[facility] ED  Emergency Department  Emergency Medicine Resident Sign-out     Care of [facility] was assumed from Dr. Seferino Koroma and is being seen for Cardiac Arrest  .  The patient's initial evaluation and plan have been discussed with the prior provider who initially evaluated the patient. EMERGENCY DEPARTMENT COURSE / MEDICAL DECISION MAKING:       MEDICATIONS GIVEN:  Orders Placed This Encounter   Medications    EPINEPHrine 1 MG/10ML injection    0.9 % sodium chloride bolus    norepinephrine-sodium chloride (LEVOPHED) 16-0.9 MG/250ML-% infusion     Shalom Fleming: cabinet override    calcium gluconate 1000 mg in sodium chloride 50 mL    magnesium sulfate 2 GM/50ML infusion     Shalom Fleming: cabinet override    piperacillin-tazobactam (ZOSYN) 3,375 mg in dextrose 5 % 50 mL IVPB (mini-bag)     Order Specific Question:   Antimicrobial Indications     Answer:   Sepsis of Unknown Etiology    vancomycin (VANCOCIN) 1250 mg in sodium chloride 0.9% 250 mL IVPB     Order Specific Question:   Antimicrobial Indications     Answer:   Sepsis of Unknown Etiology    vancomycin (VANCOCIN) intermittent dosing (placeholder)     Order Specific Question:   Antimicrobial Indications     Answer:   Sepsis of Unknown Etiology    EPINEPHrine 1 MG/10ML injection     Makayla Santana: cabinet override    AND Linked Order Group     insulin regular (HUMULIN R;NOVOLIN R) injection 10 Units     dextrose 50 % IV solution    glucose (GLUTOSE) 40 % oral gel 15 g    dextrose 50 % IV solution    glucagon (rDNA) injection 1 mg    dextrose 5 % solution    dextrose 10 % infusion     Shalom Fleming: cabinet override    levothyroxine (SYNTHROID) injection 200 mcg     Order Specific Question:   Is levothyroxine IV being used to treat myxedema coma? Answer: Yes    EPINEPHrine (EPINEPHrine HCL) 5 mg in dextrose 5 % 250 mL infusion     Order Specific Question:   Titrate Infusion?      Answer: Yes     Order Specific Question:   Initial Infusion Dose: Answer:   2 mcg/min     Order Specific Question:   Goal of Therapy is: Answer:   MAP greater than 65 mmHg     Order Specific Question:   Contact Provider if:     Answer:   Patient is receiving the maximum dose and is not achieving the goal of therapy    norepinephrine (LEVOPHED) 16 mg in sodium chloride 0.9 % 250 mL infusion (non-weight-based)     Order Specific Question:   Titrate Infusion? Answer:   Yes     Order Specific Question:   Initial Infusion Dose: Answer:   5 mcg/min     Order Specific Question:   Goal of Therapy is:      Answer:   MAP greater than 65 mmHg     Order Specific Question:   Contact Provider if:     Answer:   Patient is receiving the maximum dose and is not achieving the goal of therapy    dextrose 10 % infusion    hydrocortisone sodium succinate PF (SOLU-CORTEF) injection 100 mg    sodium chloride flush 0.9 % injection 5-40 mL    sodium chloride flush 0.9 % injection 5-40 mL    0.9 % sodium chloride infusion    OR Linked Order Group     ondansetron (ZOFRAN-ODT) disintegrating tablet 4 mg     ondansetron (ZOFRAN) injection 4 mg    polyethylene glycol (GLYCOLAX) packet 17 g    OR Linked Order Group     acetaminophen (TYLENOL) tablet 650 mg     acetaminophen (TYLENOL) suppository 650 mg    famotidine (PEPCID) injection 20 mg    sodium zirconium cyclosilicate (LOKELMA) oral suspension 10 g    sodium bicarbonate 150 mEq in dextrose 5 % 1,000 mL infusion    liothyronine (TRIOSTAT) injection 20 mcg    hydrocortisone sodium succinate PF (SOLU-CORTEF) injection 100 mg    sodium bicarbonate 8.4 % injection 50 mEq    sodium bicarbonate 8.4 % injection 50 mEq    0.9 % sodium chloride bolus    heparin (porcine) injection 1,400 Units    heparin (porcine) injection 1,500 Units    insulin regular (HUMULIN R;NOVOLIN R) injection 10 Units       LABS / RADIOLOGY:     Labs Reviewed   CBC WITH AUTO DIFFERENTIAL - Abnormal; Notable for the following components:       Result Value    RBC 3.88 (*)     .3 (*)     RDW 19.2 (*)     NRBC Automated 0.4 (*)     Seg Neutrophils 88 (*)     Lymphocytes 9 (*)     Eosinophils % 0 (*)     Segs Absolute 9.85 (*)     All other components within normal limits   COMPREHENSIVE METABOLIC PANEL W/ REFLEX TO MG FOR LOW K - Abnormal; Notable for the following components:    Glucose 270 (*)     BUN 71 (*)     CREATININE 1.65 (*)     Potassium 6.9 (*)     CO2 16 (*)     Alkaline Phosphatase 138 (*)     ALT 55 (*)     AST 97 (*)     Total Bilirubin 5.10 (*)     Total Protein 4.6 (*)     Albumin 2.3 (*)     GFR Non- 30 (*)     GFR  36 (*)     All other components within normal limits   PROTIME-INR - Abnormal; Notable for the following components:    Protime 38.4 (*)     All other components within normal limits   APTT - Abnormal; Notable for the following components:    PTT 68.6 (*)     All other components within normal limits   LACTIC ACID - Abnormal; Notable for the following components:    Lactic Acid, Whole Blood 13.5 (*)     All other components within normal limits   TSH WITH REFLEX - Abnormal; Notable for the following components:    TSH 7.51 (*)     All other components within normal limits   BRAIN NATRIURETIC PEPTIDE - Abnormal; Notable for the following components:    Pro-BNP 10,835 (*)     All other components within normal limits   TROPONIN - Abnormal; Notable for the following components:    Troponin, High Sensitivity 124 (*)     All other components within normal limits   BLOOD GAS, VENOUS - Abnormal; Notable for the following components:    pH, Eladio 6.928 (*)     pCO2, Eladio 82.1 (*)     pO2, Eladio 51.6 (*)     HCO3, Venous 16.3 (*)     Negative Base Excess, Eladio 18.4 (*)     All other components within normal limits   IMMATURE PLATELET FRACTION - Abnormal; Notable for the following components:    Platelet, Immature Fraction 13.3 (*)     Platelet, Fluorescence 21 (*)     All other components within normal limits   CK - Abnormal; Notable for the following components:     Total  (*)     All other components within normal limits   MYOGLOBIN, SERUM - Abnormal; Notable for the following components:    Myoglobin 4,549 (*)     All other components within normal limits   T4, FREE - Abnormal; Notable for the following components:    Thyroxine, Free 0.61 (*)     All other components within normal limits   ELECTROLYTES PLUS - Abnormal; Notable for the following components:    POC Potassium 6.2 (*)     POC Chloride 113 (*)     POC TCO2 18 (*)     All other components within normal limits   CALCIUM, IONIC (POC) - Abnormal; Notable for the following components:    POC Ionized Calcium 1.36 (*)     All other components within normal limits   LACTATE, SEPSIS - Abnormal; Notable for the following components:    Lactic Acid, Sepsis, Whole Blood 14.6 (*)     All other components within normal limits   ELECTROLYTES PLUS - Abnormal; Notable for the following components:    POC Potassium 7.8 (*)     POC Chloride 112 (*)     POC TCO2 19 (*)     All other components within normal limits   HGB/HCT - Abnormal; Notable for the following components:    POC Hemoglobin 9.8 (*)     POC Hematocrit 29 (*)     All other components within normal limits   CALCIUM, IONIC (POC) - Abnormal; Notable for the following components:    POC Ionized Calcium 1.37 (*)     All other components within normal limits   BASIC METABOLIC PANEL W/ REFLEX TO MG FOR LOW K - Abnormal; Notable for the following components:    Glucose 473 (*)     BUN 67 (*)     CREATININE 1.54 (*)     Chloride 109 (*)     CO2 12 (*)     Anion Gap 20 (*)     GFR Non- 32 (*)     GFR  39 (*)     All other components within normal limits   VENOUS BLOOD GAS, POINT OF CARE - Abnormal; Notable for the following components:    pH, Eladio 7.091 (*)     pCO2, Eladio 52.1 (*)     HCO3, Venous 15.9 (*)     Negative Base Excess, Eladio 14 (*)     O2 Sat, Eladio 50 (*)     All other components within normal limits   CREATININE W/GFR POINT OF CARE - Abnormal; Notable for the following components:    POC Creatinine 2.70 (*)     GFR Comment 21 (*)     GFR Non- 17 (*)     All other components within normal limits   UREA N (POC) - Abnormal; Notable for the following components:    POC BUN 79 (*)     All other components within normal limits   LACTIC ACID,POINT OF CARE - Abnormal; Notable for the following components:    POC Lactic Acid 12.27 (*)     All other components within normal limits   POCT GLUCOSE - Abnormal; Notable for the following components:    POC Glucose 118 (*)     All other components within normal limits   POC GLUCOSE FINGERSTICK - Abnormal; Notable for the following components:    POC Glucose 14 (*)     All other components within normal limits   POC GLUCOSE FINGERSTICK - Abnormal; Notable for the following components:    POC Glucose 10 (*)     All other components within normal limits   POC GLUCOSE FINGERSTICK - Abnormal; Notable for the following components:    POC Glucose 55 (*)     All other components within normal limits   VENOUS BLOOD GAS, POINT OF CARE - Abnormal; Notable for the following components:    pH, Eladio 6.837 (*)     pCO2, Eladio 93.5 (*)     HCO3, Venous 15.8 (*)     Negative Base Excess, Eladio 18 (*)     O2 Sat, Eladio 36 (*)     All other components within normal limits   CREATININE W/GFR POINT OF CARE - Abnormal; Notable for the following components:    POC Creatinine 2.84 (*)     GFR Comment 19 (*)     GFR Non- 16 (*)     All other components within normal limits   UREA N (POC) - Abnormal; Notable for the following components:    POC BUN 71 (*)     All other components within normal limits   LACTIC ACID,POINT OF CARE - Abnormal; Notable for the following components:    POC Lactic Acid 16.10 (*)     All other components within normal limits   POCT GLUCOSE - Abnormal; Notable for the following components:    POC Glucose 278 (*)     All other components within normal limits   ARTERIAL BLOOD GAS, POC - Abnormal; Notable for the following components:    POC pH 6.891 (*)     POC pCO2 60.0 (*)     POC HCO3 11.5 (*)     Negative Base Excess, Art 21 (*)     POC O2 SAT 90 (*)     All other components within normal limits   LACTIC ACID,POINT OF CARE - Abnormal; Notable for the following components:    POC Lactic Acid 13.16 (*)     All other components within normal limits   POCT GLUCOSE - Abnormal; Notable for the following components:    POC Glucose 508 (*)     All other components within normal limits   CULTURE, URINE   CULTURE, BLOOD 1   CULTURE, BLOOD 2   HGB/HCT   URINALYSIS WITH MICROSCOPIC   GLUCOSE, RANDOM   COMPREHENSIVE METABOLIC PANEL W/ REFLEX TO MG FOR LOW K   LACTATE, SEPSIS   LACTATE, SEPSIS   LACTATE, SEPSIS   LACTATE, SEPSIS   CBC WITH AUTO DIFFERENTIAL   CORTISOL TOTAL   PROTIME-INR   APTT   POCT GLUCOSE   POCT GLUCOSE   POCT GLUCOSE   POCT GLUCOSE   POCT GLUCOSE   POCT GLUCOSE   POCT GLUCOSE   POCT GLUCOSE   POCT GLUCOSE       XR CHEST PORTABLE    Result Date: 4/11/2022  EXAMINATION: ONE XRAY VIEW OF THE CHEST 4/11/2022 3:39 pm COMPARISON: April 18, 2021 HISTORY: ORDERING SYSTEM PROVIDED HISTORY: intubation TECHNOLOGIST PROVIDED HISTORY: intubation Reason for Exam: supine,cardiac arrest,et placement FINDINGS: The tip of the ET tube is approximately 3 cm above the lonny. Marked enlargement of the cardiac silhouette is present. Mild to moderate vascular congestion is noted. Small right pleural effusion is present. No pneumothorax is noted. Degenerative changes are present throughout the spine. No acute osseous abnormality is present. 1. ET tube in place, tip 3 cm above the lonny 2. Marked enlargement of the cardiac silhouette, differential considerations include pan chamber enlargement versus large pericardial effusion 3.  Small right pleural effusion, with mild to moderate vascular congestion       RECENT VITALS:     Temp: (!) 83.5 °F (28.6 °C),  Pulse: 55, Resp: 18, BP: 108/82, SpO2: 90 %      This patient is a 80 y.o. Female with cardiac arrest.  Patient has had multiple times of losing pulses, ROSC. Patient eventually started on epinephrine drip, Levophed. Was found to be hypothermic, hypoglycemic, concern for myxedema coma. Was given multiple amps of dextrose, eventually started on D10 drip. Patient eventually given calcium chloride for hyperkalemia, started on levothyroxine drip, given stress dose of Solu-Cortef, admitted to MICU team.  Patient family did decide to change patient CODE STATUS to DNR CCA  Nephrology is on board, plan was for emergent dialysis after Marck catheter placement  After there was an attempt of left Marck catheter placement, patient did become more hypotensive and eventually lost pulses. No CPR was initiated upon family request, patient time of death was declared at 22:14        PROCEDURE NOTE - ARTERIAL LINE PLACEMENT    PATIENT NAME: [facility] RECORD NO. [id number]  DATE: 4/12/2022  ATTENDING PHYSICIAN:  Casey Thomas      PREOPERATIVE DIAGNOSIS:  Need for blood pressure monitoring  POSTOPERATIVE DIAGNOSIS:  Same  PROCEDURE PERFORMED: Right Radial Arterial Line Insertion  PERFORMING PHYSICIAN:  Duc Escoto MD  ESTIMATED BLOOD LOSS:  Less than 25 ml  COMPLICATIONS:  None immediately appreciated. DISCUSSION:  Manpreet Jara is a 80 y.o. female who requires invasive pressure monitoring. The history and physical examination were reviewed and confirmed. CONSENT: Unable to be obtained due to the emergent nature of this procedure. PROCEDURE:  A timeout was initiated by the bedside nurse and was confirmed by those present. The patient was placed in a supine position. The skin overlying the Right Radial was prepped with chlorhexadine.  Through this region, the introducer needle through catheter was inserted into  until pulsatile bright blood was seen in collection tubing. Guidewire was advanced with no resistance. Catheter was advanced into the artery, wire was pulled with brisk bleeding noted. Pressure monitoring setup was connected to the catheter, it aspirated and flushed easily. The catheter was secured to the wrist with 3-0 silk. No immediate complication was evident. All sponge, instrument and needle counts were correct at the completion of the procedure. Kaylyn Green MD  5:03 AM, 22  ED Course as of 22 [id number]      6120 I did attempt to talk to PCP listed Lluvia Chahal however unable to answer, I did leave a voicemail. Also spoke to , Lilia Sotomayor, it would be a natural death. Patient body to be released [AN]      ED Course User Index  [AN] Kaylyn Green MD       OUTSTANDING TASKS / RECOMMENDATIONS:    1. FINAL IMPRESSION:     1. Cardiac arrest [facility])        DISPOSITION:         DISPOSITION:  [x]      []  Transfer -    []  Admission -     []  Against Medical Advice   []  Eloped   FOLLOW-UP: No follow-up provider specified.    DISCHARGE MEDICATIONS: New Prescriptions    No medications on file          Kaylyn Green MD  Emergency Medicine Resident  [address] [facility]        Kaylyn Green MD  Resident  [phone]

## 2022-04-15 NOTE — PROGRESS NOTES
Physician Progress Note      Roxanne Saleh  CSN #:                  [id number]  :                       1940  ADMIT DATE:       2022 5:40 PM  [address] DATE:        2022 6:41 AM  RESPONDING  PROVIDER #:        Moe Gamble MD          QUERY TEXT:    Patient admitted following cardiac arrest.  If possible, please document in   progress notes and discharge summary the most likely cause of cardiac arrest:    The medical record reflects the following:  Risk Factors: HX CHF, HTN, PE  Clinical Indicators: Cr 2.84 w/ hyperkalemia 6.9  Trop 124 ALKPHOS 138 alt 55   AST 97 protein 4.6 albumin 2.3 INR 4.0 lactic acid 13.5 VBG 6.928 Pco2 82.1   Hco3 16.3, Platelet 21  TSH was elevated at 7.51, free T4 0.61 EKG wide QRS    Treatment:  heparin drip could not be started for elevated troponins, 2 amps   of IV bicarb were pushed, Marck catheter was attempted for emergent   hemodialysis but was unsuccessful due to persistent bleeding secondary to   coagulopathy, thrombocytopenia Piperacillin IV, Levaquin IV,  Solu-Medrol IV,   ACLS/ CPR protocol with intubation, epi, calcium, sodium bicarb      Thank you, please contact me for any questions! Melody Bobby RN CDI Supervisor   [phone]  Options provided:  -- Cardiac Arrest likely due to NSTEMI  -- Cardiac Arrest likely due to myxedema coma  -- Cardiac Arrest likely due to CHANEL w/ hyperkalemia  -- Cardiac Arrest likely due to combination of events of  CHANEL w/ hyperkalemia,   hypoglycemia, with NSTEMI  -- Other - I will add my own diagnosis  -- Disagree - Not applicable / Not valid  -- Disagree - Clinically unable to determine / Unknown  -- Refer to Clinical Documentation Reviewer    PROVIDER RESPONSE TEXT:    This patient had cardiac arrest likely due to CHANEL w/ hyperkalemia   hypoglycemia, with NSTEMI.     Query created by: Mikel Stock on 2022 12:04 PM      QUERY TEXT:    Pt admitted with Cardiac arrest. Pt noted to have elevated creatinine with plan for Marck catheter. If possible, please document in the progress notes   and discharge summary if you are evaluating and/or treating any of the   following: The medical record reflects the following:  Risk Factors: hx CHF, HTN  Clinical Indicators: BUN/CR 67/1.54>>71/1.65>> Cr 2.84, Potassium 7.8  Treatment: attempt Marck catheter, Sodium bicarb IV    Thank you, please contact me for any questions! Barry Santillan RN CDI Supervisor   [phone]    Defined by Kidney Disease Improving Global Outcomes (KDIGO) clinical practice   guideline for acute kidney injury:  -Increase in SCr by greater than or equal to 0.3 mg/dl within 48 hours; or  -Increase or decrease in SCr to greater than or equal to 1.5 times baseline,   which is known or presumed to have occurred within the prior 7 days; or  -Urine volume < 0.5ml/kg/h for 6 hours  Options provided:  -- Acute kidney failure  -- Acute kidney failure with acute tubular necrosis  -- Other - I will add my own diagnosis  -- Disagree - Not applicable / Not valid  -- Disagree - Clinically unable to determine / Unknown  -- Refer to Clinical Documentation Reviewer    PROVIDER RESPONSE TEXT:    This patient is in Acute kidney failure.     Query created by: Severa Peace on 4/14/2022 12:10 PM      Electronically signed by:  Saad Coe MD 4/15/2022 10:50 AM

## 2022-04-15 NOTE — H&P
Critical Care - History and Physical Examination    Patient's name:  Edgar Ward [place] Record Number: [id number]  Patient's account/billing number: [de-identified]  Patient's YOB: 1940  Age: 80 y.o. Date of Admission: 4/11/2022  5:40 PM  Date of History and Physical Examination: 4/15/2022      Primary Care Physician: PEBBLES Chandra CNP  Attending Physician: Dr. Arthur Darnell Status: Prior    Chief complaint:   Chief Complaint   Patient presents with    Cardiac Arrest         HISTORY OF PRESENT ILLNESS:        Gigi Bangura is a 80 y.o. with PMH of   -HTN  -arthritis  -CHF  -PE  -asthma    Presented to ER s/p cardiac arrest x 3, was found down at home by family, last well known 5 am. According to EMS, patient had an initial blood glucose of 30.  She was given an amp of dextrose and on recheck it was down into the 25s.  Patient was then going to be given glucagon by EMS.  Patient was also bradycardic into the 30s. As per report, patient had 3 episodes of cardiac arrest (PEA, asystole and again PEA) 2 of which were witnessed in the ER. She was administered calcium chloride, calcium gluconate, bicarb, amps of D50, IV epinephrine during cardiac arrest and ROSC was obtained. Patient was intubated, on PRVC mode RR 16/ /PEEP 8/FiO2 100 with venous blood gas showing pH 6.92, PCO2 82.1, bicarb 16.3. She was started on epinephrine gtt and Levophed gtt for pressor support. Of note, patient was hypothermic since time of admission, temp 82.2F. Was started with passive rewarming with german hugger     Labs were significant for hyperkalemia with S2.4, metabolic acidosis with bicarb 16, lactic acid 13.5, elevated creatinine 1.65, BUN 71.   proBNP 10,835, troponin I 24, , myoglobin 4549. TSH was elevated at 7.51, free T4 0.61  Hemoglobin 12.8  PTT 68.6, INR 4.0  EKG showed wide QRS complex but no ST segment changes.       PAST MEDICAL HISTORY:         Diagnosis Date    Arthritis     Asthma     Congestive heart failure (CHF) ([facility] [address] 4/18/2021    Hyperlipidemia     Hypertension     Pulmonary embolism ([facility] [address]          PAST SURGICAL HISTORY:         Procedure Laterality Date    COLONOSCOPY      COLONOSCOPY N/A 1/15/2019    COLONOSCOPY DIAGNOSTIC performed by Rufino Black MD at [facility]    COLONOSCOPY  1/16/2019    COLONOSCOPY POLYPECTOMY SNARE/COLD BIOPSY performed by Rufino Black MD at [address] Left     UPPER GASTROINTESTINAL ENDOSCOPY N/A 1/15/2019    EGD BIOPSY performed by Rufino Black MD at [facility]       ALLERGIES:      Allergies   Allergen Reactions    Morphine     Sulfa Antibiotics          HOME MEDS: :      Prior to Admission medications    Medication Sig Start Date End Date Taking? Authorizing Provider   metoprolol succinate (TOPROL XL) 25 MG extended release tablet Take 1 tablet by mouth daily 4/19/21   Francine Munoz MD   amLODIPine (NORVASC) 5 MG tablet Take 1 tablet by mouth daily 4/20/21   Francine Munoz MD   ferrous sulfate 325 (65 Fe) MG EC tablet Take 1 tablet by mouth 2 times daily (with meals) 1/17/19   Tamica Gavin MD   albuterol sulfate  (90 Base) MCG/ACT inhaler Inhale 2 puffs into the lungs every 4 hours as needed for Wheezing 1/17/19   Soham Boone MD   potassium chloride (MICRO-K) 10 MEQ extended release capsule Take 20 mEq by mouth 2 times daily     Historical Provider, MD   montelukast (SINGULAIR) 10 MG tablet Take 5 mg by mouth nightly     Historical Provider, MD   aspirin 81 MG EC tablet Take 81 mg by mouth daily    Historical Provider, MD       SOCIAL HISTORY:       TOBACCO:   reports that she has quit smoking. She smoked 1.00 pack per day. She has never used smokeless tobacco.  ETOH:   reports no history of alcohol use. DRUGS:  reports no history of drug use. OCCUPATION:       FAMILY HISTORY:      No family history on file.     REVIEW OF SYSTEMS (ROS):     Review of Systems - Could not be obtained due to acuity of condition    OBJECTIVE:     VITAL SIGNS:  /82   Pulse 55   Temp (!) 83.5 °F (28.6 °C)   Resp 18   Ht 5' 8\" (1.727 m)   Wt 195 lb (88.5 kg)   SpO2 90%   BMI 29.65 kg/m²   Tmax over 24 hours:  No data recorded. No data found. No intake or output data in the 24 hours ending 04/15/22 1039    Wt Readings from Last 3 Encounters:   04/11/22 195 lb (88.5 kg)   04/19/21 195 lb 3.2 oz (88.5 kg)   01/14/19 246 lb (111.6 kg)     Body mass index is 29.65 kg/m². PHYSICAL EXAM:  Constitutional:  intubated/sedated  EENT: neck supple with midline trachea. Neck: swelling on right side of neck  Respiratory: clear to auscultation, no wheezes or rales and unlabored breathing. No intercostal tenderness  Cardiovascular: regular rate and rhythm, normal S1, S2, no murmur noted  Abdomen: soft, nontender, nondistended, no masses or organomegaly  Extremities: pedal edema, skin wounds on bilateral LEs    MEDICATIONS:  Scheduled Meds:    Continuous Infusions:    PRN Meds:         ABGs:   Lab Results   Component Value Date    FIO2 100.0 04/11/2022       DATA:  Complete Blood Count: No results for input(s): WBC, RBC, HGB, HCT, MCV, MCH, MCHC, RDW, PLT, MPV in the last 72 hours. Last 3 Blood Glucose:   No results for input(s): GLUCOSE in the last 72 hours. PT/INR:    Lab Results   Component Value Date    PROTIME 38.4 04/11/2022    INR 4.0 04/11/2022     PTT:    Lab Results   Component Value Date    APTT 68.6 04/11/2022       Comprehensive Metabolic Profile:   No results for input(s): NA, K, CL, CO2, BUN, CREATININE, GLUCOSE, CALCIUM, PROT, LABALBU, BILITOT, ALKPHOS, AST, ALT in the last 72 hours.    Magnesium:   Lab Results   Component Value Date    MG 2.2 04/18/2021    MG 1.9 01/15/2019     Phosphorus:   Lab Results   Component Value Date    PHOS 2.0 04/18/2021     Ionized Calcium: No results found for: CAION     Urinalysis:   Lab Results   Component Value Date    NITRU ICU PROPHYLAXIS:  Stress ulcer:  [] PPI Agent  [] [id number] [] Sucralfate  [] Other:  VTE:   [] Enoxaparin  [] Unfract. Heparin Subcut  [] EPC Cuffs    NUTRITION:  [x] NPO  [] Tube Feeding (Specify: ) [] TPN  [] PO    CONSULTATION NEEDED:  [] No   [] Yes     HOME MEDICATIONS RECONCILED: [x] No  [] Yes    FAMILY UPDATED:    [] No   [x] Yes     ADDITIONAL PLAN:  CT head stat  Insulin, dextrose, calcium gluconate. Bicarb gtt for hyperkalemia  NS fluid bolus  Pressor support as needed  Trend troponins  D10 gtt for hypoglycemia  ABG    Valdo Vinson MD, MD  ICU resident   [facility] [place] [facility]  4/15/2022 10:39 AM    The critical care team assigned to the patient will be following up the patient in the intensive care unit. I have discussed the current plan with the critical care attending. The above mentioned assessment and plan will be reviewed again in detail by the critical care attending at bedside, and can be further changed or modified accordingly by the attending physician.

## 2022-04-20 ENCOUNTER — TELEPHONE (OUTPATIENT)
Dept: PULMONOLOGY | Age: 82
End: 2022-04-20

## 2022-04-20 NOTE — TELEPHONE ENCOUNTER
I did not see the patient personally and was seen by ICU resident. She  in the ED before transfer to ICU. Please send the death certificate to ER attending.

## 2022-04-21 NOTE — TELEPHONE ENCOUNTER
Staff called the  home and informed Agata Dwyer the signing provider would be the ED attending, Dr. Hiren Bedoya. The pt  in the ED and was not seen by Dr. Lowell Adler. She verbalized understanding and stated she would pass the information along to the appropriate person.

## (undated) DEVICE — FORCEP BX MESH TOOTH MIC 2.8 MMX240 CM NDL STRL RADIAL JAW 4

## (undated) DEVICE — SNARE ENDOSCP M L240CM LOOP W27MM SHTH DIA2.4MM OVL FLX